# Patient Record
Sex: MALE | Race: BLACK OR AFRICAN AMERICAN | NOT HISPANIC OR LATINO | Employment: UNEMPLOYED | URBAN - METROPOLITAN AREA
[De-identification: names, ages, dates, MRNs, and addresses within clinical notes are randomized per-mention and may not be internally consistent; named-entity substitution may affect disease eponyms.]

---

## 2017-01-03 ENCOUNTER — OFFICE VISIT (OUTPATIENT)
Dept: INTERNAL MEDICINE | Facility: CLINIC | Age: 57
End: 2017-01-03
Payer: COMMERCIAL

## 2017-01-03 VITALS
HEIGHT: 70 IN | TEMPERATURE: 97 F | SYSTOLIC BLOOD PRESSURE: 124 MMHG | WEIGHT: 222 LBS | HEART RATE: 74 BPM | DIASTOLIC BLOOD PRESSURE: 80 MMHG | BODY MASS INDEX: 31.78 KG/M2

## 2017-01-03 DIAGNOSIS — S16.1XXS CERVICAL STRAIN, ACUTE, SEQUELA: Primary | ICD-10-CM

## 2017-01-03 DIAGNOSIS — S09.90XD MINOR HEAD INJURY, SUBSEQUENT ENCOUNTER: ICD-10-CM

## 2017-01-03 PROCEDURE — 99213 OFFICE O/P EST LOW 20 MIN: CPT | Mod: S$GLB,,, | Performed by: PHYSICIAN ASSISTANT

## 2017-01-03 PROCEDURE — 3079F DIAST BP 80-89 MM HG: CPT | Mod: S$GLB,,, | Performed by: PHYSICIAN ASSISTANT

## 2017-01-03 PROCEDURE — 1159F MED LIST DOCD IN RCRD: CPT | Mod: S$GLB,,, | Performed by: PHYSICIAN ASSISTANT

## 2017-01-03 PROCEDURE — 99999 PR PBB SHADOW E&M-EST. PATIENT-LVL III: CPT | Mod: PBBFAC,,, | Performed by: PHYSICIAN ASSISTANT

## 2017-01-03 PROCEDURE — 3074F SYST BP LT 130 MM HG: CPT | Mod: S$GLB,,, | Performed by: PHYSICIAN ASSISTANT

## 2017-01-03 RX ORDER — ETODOLAC 400 MG/1
TABLET, FILM COATED ORAL
Refills: 0 | COMMUNITY
Start: 2016-12-24 | End: 2017-01-11

## 2017-01-03 RX ORDER — OXYCODONE AND ACETAMINOPHEN 7.5; 325 MG/1; MG/1
TABLET ORAL
Refills: 0 | COMMUNITY
Start: 2016-12-24 | End: 2017-04-10

## 2017-01-03 RX ORDER — DIAZEPAM 5 MG/1
TABLET ORAL
Refills: 0 | COMMUNITY
Start: 2016-12-24 | End: 2017-08-29 | Stop reason: ALTCHOICE

## 2017-01-03 NOTE — PROGRESS NOTES
"Subjective:       Patient ID: Epifanio Godoy is a 56 y.o. male.    Chief Complaint: Letter for School/Work (return to work)    HPI  Patient comes in today for follow up neck injury and needs paperwork filled out to return to work.   On 12/22 he fell after a party and hit his head.  He did drink ETOH.  He was sent to [facility] ER and workup (CT scan head, neck imaging) was negative.   He was sent home.  He returned to ER 2 days later due to neck pain.  More images were run and were negative.  We have records and they were reviewed.  Patient was discharged on pain medication and anti-inflammatories and muscle relaxers.  He is doing much better. Has finished medication.  States he has no issues or pain at this time. He is ready to return to work.  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  We discussed his ability to lift, bend, squat, sit, stand, and climb, walk for multiple hours and he states he is perfectly fine doing all this. Denies limitations. 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       Past Medical History   Diagnosis Date    Diabetes mellitus type II     Hypertension     Kidney stones     Type II or unspecified type diabetes mellitus without mention of complication, not stated as uncontrolled        Current Outpatient Prescriptions   Medication Sig Dispense Refill    atorvastatin (LIPITOR) 40 MG tablet Take 1 tablet (40 mg total) by mouth once daily. 90 tablet 3    BD INSULIN PEN NEEDLE UF MINI 31 gauge x 3/16" Ndle USE 3 TIMES PER  each 10    canagliflozin (INVOKANA) 300 mg Tab Take 300 mg by mouth once daily. 30 tablet 11    insulin glargine (LANTUS SOLOSTAR) 100 unit/mL (3 mL) InPn pen INJECT 15-30 UNITS INTO THE SKIN ONCE DAILY. Pt wants 90 day supply 3 Box 3    liraglutide 0.6 mg/0.1 mL, 18 mg/3 mL, subq PNIJ (VICTOZA 3-JULIETTE) 0.6 mg/0.1 mL (18 mg/3 mL) PnIj Inject 1.8 mg into the skin once daily. Please " "dispense 90 day supply if possible 9 mL 3    losartan (COZAAR) 100 MG tablet Take 1 tablet (100 mg total) by mouth once daily. 1 Tablet Oral Every day 90 tablet 3    tadalafil (CIALIS) 20 MG Tab Use one tablet every 36 hours as needed for ED 6 tablet 5    diazePAM (VALIUM) 5 MG tablet TK 1 T PO  TID PRF MUSCLE SPASMS  0    etodolac (LODINE) 400 MG tablet TK 1 T PO  TID  0    oxycodone-acetaminophen (PERCOCET) 7.5-325 mg per tablet TK 1 T PO  Q 6 H PRF PAIN  0    triamcinolone acetonide 0.1% (KENALOG) 0.1 % cream Apply topically 2 (two) times daily. 30 g 3     No current facility-administered medications for this visit.        Review of Systems   Constitutional: Negative.    HENT: Negative.    Eyes: Negative.    Respiratory: Negative.    Cardiovascular: Negative.    Gastrointestinal: Negative.    Endocrine: Negative.    Genitourinary: Negative.    Musculoskeletal: Negative.    Allergic/Immunologic: Negative.    Neurological: Negative.    Hematological: Negative.    Psychiatric/Behavioral: Negative.        Objective:     Visit Vitals    /80    Pulse 74    Temp 97 °F (36.1 °C) (Tympanic)    Ht 5' 10" (1.778 m)    Wt 100.7 kg (222 lb)    BMI 31.85 kg/m2        Physical Exam   Constitutional: He is oriented to person, place, and time. He appears well-developed and well-nourished. No distress.   HENT:   Head: Normocephalic and atraumatic.       Right Ear: External ear normal.   Left Ear: External ear normal.   Nose: Nose normal.   Mouth/Throat: Oropharynx is clear and moist. No oropharyngeal exudate.   TMs normal    Eyes: Conjunctivae and EOM are normal. Pupils are equal, round, and reactive to light.   Neck: Trachea normal, normal range of motion and full passive range of motion without pain. Neck supple. No spinous process tenderness and no muscular tenderness present. No rigidity. No edema, no erythema and normal range of motion present. No thyroid mass present.   Cardiovascular: Normal rate.  "   Abdominal: Soft. Bowel sounds are normal.   Genitourinary:   Genitourinary Comments: Exam not done    Musculoskeletal: Normal range of motion.   Neurological: He is alert and oriented to person, place, and time. He has normal reflexes.   Skin: Skin is warm.   Psychiatric: He has a normal mood and affect. His behavior is normal. Judgment and thought content normal.   Vitals reviewed.        Lab Results   Component Value Date    WBC 5.09 10/31/2016    HGB 13.7 (L) 10/31/2016    HCT 42.7 10/31/2016     10/31/2016    CHOL 152 10/31/2016    TRIG 68 11/08/2016    HDL 25 (L) 10/31/2016    ALT 24 10/31/2016    AST 24 10/31/2016     10/31/2016    K 4.4 10/31/2016     10/31/2016    CREATININE 1.1 10/31/2016    BUN 15 10/31/2016    CO2 19 (L) 10/31/2016    TSH 1.294 10/31/2016    PSA 0.24 01/12/2016    HGBA1C 6.2 10/31/2016       Assessment:       1. Cervical strain, acute, sequela    2. Minor head injury, subsequent encounter        Plan:   Cervical strain, acute, sequela    Minor head injury, subsequent encounter      Reviewed hosp records  All scans negative   Patient improved and issue resolved.    Ok to return to work.

## 2017-01-03 NOTE — MR AVS SNAPSHOT
"    [facility]  [address] [place] [address]  Phone: [phone]  Fax: [phone]                  Epifanio Godoy   1/3/2017 8:20 AM   Office Visit    Description:  Male : 1960   Provider:  LAUREN Mcneill   [facility]           Reason for Visit     Letter for School/Work                To Do List           Future Appointments        Provider Department Dept Phone    2017 3:00 PM Holly Shepherd MD [facility] [phone]      Goals (5 Years of Data)     None      [facility]     [facility] On Call Nurse Care Line -  Assistance  Registered nurses in the [facility] On Call Center provide clinical advisement, health education, appointment booking, and other advisory services.  Call for this free service at [phone].             Medications           Message regarding Medications     Verify the changes and/or additions to your medication regime listed below are the same as discussed with your clinician today.  If any of these changes or additions are incorrect, please notify your healthcare provider.             Verify that the below list of medications is an accurate representation of the medications you are currently taking.  If none reported, the list may be blank. If incorrect, please contact your healthcare provider. Carry this list with you in case of emergency.           Current Medications     atorvastatin (LIPITOR) 40 MG tablet Take 1 tablet (40 mg total) by mouth once daily.    BD INSULIN PEN NEEDLE UF MINI 31 gauge x 3/16" Ndle USE 3 TIMES PER DAY    canagliflozin (INVOKANA) 300 mg Tab Take 300 mg by mouth once daily.    insulin glargine (LANTUS SOLOSTAR) 100 unit/mL (3 mL) InPn pen INJECT 15-30 UNITS INTO THE SKIN ONCE DAILY. Pt wants 90 day supply    liraglutide 0.6 mg/0.1 mL, 18 mg/3 mL, subq PNIJ (VICTOZA 3-JULIETTE) 0.6 mg/0.1 mL (18 mg/3 mL) PnIj Inject 1.8 mg into the skin once daily. Please " "dispense 90 day supply if possible    losartan (COZAAR) 100 MG tablet Take 1 tablet (100 mg total) by mouth once daily. 1 Tablet Oral Every day    tadalafil (CIALIS) 20 MG Tab Use one tablet every 36 hours as needed for ED    diazePAM (VALIUM) 5 MG tablet TK 1 T PO  TID PRF MUSCLE SPASMS    etodolac (LODINE) 400 MG tablet TK 1 T PO  TID    oxycodone-acetaminophen (PERCOCET) 7.5-325 mg per tablet TK 1 T PO  Q 6 H PRF PAIN    triamcinolone acetonide 0.1% (KENALOG) 0.1 % cream Apply topically 2 (two) times daily.           Clinical Reference Information           Vital Signs - Last Recorded  Most recent update: 1/3/2017  8:39 AM by Sharita Ramos LPN    BP Pulse Temp Ht Wt BMI    124/80 74 97 °F (36.1 °C) (Tympanic) 5' 10" (1.778 m) 100.7 kg (222 lb) 31.85 kg/m2      Blood Pressure          Most Recent Value    BP  124/80      Allergies as of 1/3/2017     Bactrim  [Sulfamethoxazole-trimethoprim]    Clindamycin    Sulfa (Sulfonamide Antibiotics)      Immunizations Administered on Date of Encounter - 1/3/2017     None      [facility] Sign-Up     Activating your [facility] account is as easy as 1-2-3!     1) Visit [email or web address], select Sign Up Now, enter this activation code and your date of birth, then select Next.  Activation code not generated  Current Patient Portal Status: Account disabled      2) Create a username and password to use when you visit [facility] in the future and select a security question in case you lose your password and select Next.    3) Enter your e-mail address and click Sign Up!    Additional Information  If you have questions, please e-mail [email or web address] or call [phone] to talk to our [facility] staff. Remember, [facility] is NOT to be used for urgent needs. For medical emergencies, dial 911.         Smoking Cessation     If you would like to quit smoking:   You may be eligible for free services if you are a [place] resident and started smoking cigarettes before September " 1, 1988.  Call the Smoking Cessation Trust (SCT) toll free at (478) 696-1024 or (363) 976-2785.   Call 2-800-QUIT-NOW if you do not meet the above criteria.

## 2017-01-10 ENCOUNTER — TELEPHONE (OUTPATIENT)
Dept: INTERNAL MEDICINE | Facility: CLINIC | Age: 57
End: 2017-01-10

## 2017-01-10 NOTE — TELEPHONE ENCOUNTER
Patient no showed a scheduled appointment with  on 1/9/17 I called to f/c and patient rescheduled he missed due to a injury he saw yusra holley

## 2017-01-11 ENCOUNTER — LAB VISIT (OUTPATIENT)
Dept: LAB | Facility: HOSPITAL | Age: 57
End: 2017-01-11
Attending: FAMILY MEDICINE
Payer: COMMERCIAL

## 2017-01-11 ENCOUNTER — OFFICE VISIT (OUTPATIENT)
Dept: INTERNAL MEDICINE | Facility: CLINIC | Age: 57
End: 2017-01-11
Payer: COMMERCIAL

## 2017-01-11 VITALS
HEART RATE: 80 BPM | WEIGHT: 221.56 LBS | SYSTOLIC BLOOD PRESSURE: 130 MMHG | DIASTOLIC BLOOD PRESSURE: 80 MMHG | TEMPERATURE: 97 F | HEIGHT: 70 IN | BODY MASS INDEX: 31.72 KG/M2

## 2017-01-11 DIAGNOSIS — E11.9 TYPE 2 DIABETES MELLITUS WITHOUT COMPLICATION, WITH LONG-TERM CURRENT USE OF INSULIN: Primary | ICD-10-CM

## 2017-01-11 DIAGNOSIS — Z79.4 TYPE 2 DIABETES MELLITUS WITHOUT COMPLICATION, WITH LONG-TERM CURRENT USE OF INSULIN: ICD-10-CM

## 2017-01-11 DIAGNOSIS — E11.69 HYPERLIPIDEMIA ASSOCIATED WITH TYPE 2 DIABETES MELLITUS: ICD-10-CM

## 2017-01-11 DIAGNOSIS — Z79.4 TYPE 2 DIABETES MELLITUS WITHOUT COMPLICATION, WITH LONG-TERM CURRENT USE OF INSULIN: Primary | ICD-10-CM

## 2017-01-11 DIAGNOSIS — I10 ESSENTIAL HYPERTENSION: ICD-10-CM

## 2017-01-11 DIAGNOSIS — E11.9 TYPE 2 DIABETES MELLITUS WITHOUT COMPLICATION, WITH LONG-TERM CURRENT USE OF INSULIN: ICD-10-CM

## 2017-01-11 DIAGNOSIS — Z72.0 TOBACCO ABUSE: ICD-10-CM

## 2017-01-11 DIAGNOSIS — E78.5 HYPERLIPIDEMIA ASSOCIATED WITH TYPE 2 DIABETES MELLITUS: ICD-10-CM

## 2017-01-11 LAB
ALBUMIN SERPL BCP-MCNC: 3.8 G/DL
ALP SERPL-CCNC: 75 U/L
ALT SERPL W/O P-5'-P-CCNC: 34 U/L
ANION GAP SERPL CALC-SCNC: 6 MMOL/L
AST SERPL-CCNC: 25 U/L
BILIRUB SERPL-MCNC: 0.3 MG/DL
BUN SERPL-MCNC: 13 MG/DL
CALCIUM SERPL-MCNC: 9.4 MG/DL
CHLORIDE SERPL-SCNC: 108 MMOL/L
CHOLEST/HDLC SERPL: 3.7 {RATIO}
CO2 SERPL-SCNC: 26 MMOL/L
CREAT SERPL-MCNC: 1 MG/DL
EST. GFR  (AFRICAN AMERICAN): >60 ML/MIN/1.73 M^2
EST. GFR  (NON AFRICAN AMERICAN): >60 ML/MIN/1.73 M^2
GLUCOSE SERPL-MCNC: 142 MG/DL
HDL/CHOLESTEROL RATIO: 27.3 %
HDLC SERPL-MCNC: 128 MG/DL
HDLC SERPL-MCNC: 35 MG/DL
LDLC SERPL CALC-MCNC: 72.2 MG/DL
NONHDLC SERPL-MCNC: 93 MG/DL
POTASSIUM SERPL-SCNC: 4 MMOL/L
PROT SERPL-MCNC: 7.5 G/DL
SODIUM SERPL-SCNC: 140 MMOL/L
TRIGL SERPL-MCNC: 104 MG/DL

## 2017-01-11 PROCEDURE — 3044F HG A1C LEVEL LT 7.0%: CPT | Mod: S$GLB,,, | Performed by: FAMILY MEDICINE

## 2017-01-11 PROCEDURE — 4010F ACE/ARB THERAPY RXD/TAKEN: CPT | Mod: S$GLB,,, | Performed by: FAMILY MEDICINE

## 2017-01-11 PROCEDURE — 99214 OFFICE O/P EST MOD 30 MIN: CPT | Mod: S$GLB,,, | Performed by: FAMILY MEDICINE

## 2017-01-11 PROCEDURE — 80053 COMPREHEN METABOLIC PANEL: CPT

## 2017-01-11 PROCEDURE — 80061 LIPID PANEL: CPT

## 2017-01-11 PROCEDURE — 83036 HEMOGLOBIN GLYCOSYLATED A1C: CPT

## 2017-01-11 PROCEDURE — 99999 PR PBB SHADOW E&M-EST. PATIENT-LVL III: CPT | Mod: PBBFAC,,, | Performed by: FAMILY MEDICINE

## 2017-01-11 PROCEDURE — 3075F SYST BP GE 130 - 139MM HG: CPT | Mod: S$GLB,,, | Performed by: FAMILY MEDICINE

## 2017-01-11 PROCEDURE — 2022F DILAT RTA XM EVC RTNOPTHY: CPT | Mod: S$GLB,,, | Performed by: FAMILY MEDICINE

## 2017-01-11 PROCEDURE — 36415 COLL VENOUS BLD VENIPUNCTURE: CPT | Mod: PO

## 2017-01-11 PROCEDURE — 3079F DIAST BP 80-89 MM HG: CPT | Mod: S$GLB,,, | Performed by: FAMILY MEDICINE

## 2017-01-11 PROCEDURE — 1159F MED LIST DOCD IN RCRD: CPT | Mod: S$GLB,,, | Performed by: FAMILY MEDICINE

## 2017-01-11 RX ORDER — IBUPROFEN 800 MG/1
800 TABLET ORAL EVERY 6 HOURS PRN
Qty: 40 TABLET | Refills: 0 | Status: SHIPPED | OUTPATIENT
Start: 2017-01-11 | End: 2017-01-21

## 2017-01-11 NOTE — PROGRESS NOTES
Subjective:      Patient ID: Epifanio Godoy is a 56 y.o. male.    Chief Complaint: Follow-up (dm, htn, chol)    HPI Comments: Patient's coming in today for follow-up of chronic issues.    Last a1c was at  6.3 on lantus 25 units and victoza 1.8mg. now he is currently down 6 pounds.  He is now down also to only using 15 units of the Lantus.  Also on invokana now. No problems.     He has controlled blood pressure today.   He denies any headaches or chest pain.  He has  been taking his losartan 100mg.     The patient is a smoker.  Not quitting currently. Willing to work on now.    Also cholesterol we reviewed today his current CV risk is now at 34.9. Back on cholesterol meds again.     The 10-year ASCVD risk score (Matias LIRA Jr, et al., 2013) is: 34.9%    Values used to calculate the score:      Age: 56 years      Sex: Male      Is Non- : Yes      Diabetic: Yes      Tobacco smoker: Yes      Systolic Blood Pressure: 130 mmHg      Is BP treated: Yes      HDL Cholesterol: 25 mg/dL      Total Cholesterol: 152 mg/dL        Lab Results   Component Value Date    WBC 5.09 10/31/2016    HGB 13.7 (L) 10/31/2016    HCT 42.7 10/31/2016     10/31/2016    CHOL 128 01/11/2017    TRIG 104 01/11/2017    HDL 35 (L) 01/11/2017    ALT 34 01/11/2017    AST 25 01/11/2017     01/11/2017    K 4.0 01/11/2017     01/11/2017    CREATININE 1.0 01/11/2017    BUN 13 01/11/2017    CO2 26 01/11/2017    TSH 1.294 10/31/2016    PSA 0.24 01/12/2016    HGBA1C 6.4 (H) 01/11/2017       Review of Systems   Constitutional: Negative for chills and fatigue.        Intentional wt loss of 6lbs   HENT: Negative for sore throat and trouble swallowing.    Respiratory: Negative for cough and shortness of breath.    Cardiovascular: Negative for chest pain and leg swelling.   Gastrointestinal: Negative for abdominal pain, constipation, diarrhea and nausea.   Genitourinary: Negative for difficulty urinating, dysuria and flank  pain.   Musculoskeletal: Negative for arthralgias and joint swelling.   Neurological: Negative for dizziness and headaches.     Objective:     Physical Exam   Constitutional: He appears well-developed and well-nourished.   HENT:   Head: Normocephalic and atraumatic.   Right Ear: Tympanic membrane normal.   Left Ear: Tympanic membrane normal.   Mouth/Throat: Oropharynx is clear and moist.   Eyes: Conjunctivae and EOM are normal.   Neck: Normal range of motion. Neck supple.   Cardiovascular: Normal rate and regular rhythm.    Pulmonary/Chest: Effort normal and breath sounds normal.   Psychiatric: He has a normal mood and affect. His behavior is normal.   Nursing note and vitals reviewed.    Assessment:     1. Type 2 diabetes mellitus without complication, with long-term current use of insulin    2. Hyperlipidemia associated with type 2 diabetes mellitus    3. Essential hypertension    4. Tobacco abuse      Plan:   Epifanio was seen today for follow-up.    Diagnoses and all orders for this visit:    Type 2 diabetes mellitus without complication, with long-term current use of insulin- improving. Reducing dose on lantus. Cont with weight loss. Down 6lbs.   -     Hemoglobin A1c; Future  -     Lipid panel; Future  -     Comprehensive metabolic panel; Future    Hyperlipidemia associated with type 2 diabetes mellitus- - stable, Continue with current medications and interventions. Labs reviewed.     -     Hemoglobin A1c; Future  -     Lipid panel; Future  -     Comprehensive metabolic panel; Future    Essential hypertension- stable, Continue with current medications and interventions. Labs reviewed.  -     Hemoglobin A1c; Future  -     Lipid panel; Future  -     Comprehensive metabolic panel; Future    Tobacco abuse- cont to work on quitting    Other orders  -     ibuprofen (ADVIL,MOTRIN) 800 MG tablet; Take 1 tablet (800 mg total) by mouth every 6 (six) hours as needed for Pain.               Return in about 6 months (around  7/11/2017) for dm, chol, bp.

## 2017-01-12 LAB
ESTIMATED AVG GLUCOSE: 137 MG/DL
HBA1C MFR BLD HPLC: 6.4 %

## 2017-01-13 ENCOUNTER — TELEPHONE (OUTPATIENT)
Dept: INTERNAL MEDICINE | Facility: CLINIC | Age: 57
End: 2017-01-13

## 2017-01-13 DIAGNOSIS — I10 ESSENTIAL HYPERTENSION: ICD-10-CM

## 2017-01-13 DIAGNOSIS — E11.69 HYPERLIPIDEMIA ASSOCIATED WITH TYPE 2 DIABETES MELLITUS: ICD-10-CM

## 2017-01-13 DIAGNOSIS — E11.9 TYPE 2 DIABETES MELLITUS WITHOUT COMPLICATION, WITH LONG-TERM CURRENT USE OF INSULIN: Primary | ICD-10-CM

## 2017-01-13 DIAGNOSIS — E78.5 HYPERLIPIDEMIA ASSOCIATED WITH TYPE 2 DIABETES MELLITUS: ICD-10-CM

## 2017-01-13 DIAGNOSIS — Z12.5 PROSTATE CANCER SCREENING: ICD-10-CM

## 2017-01-13 DIAGNOSIS — Z79.4 TYPE 2 DIABETES MELLITUS WITHOUT COMPLICATION, WITH LONG-TERM CURRENT USE OF INSULIN: Primary | ICD-10-CM

## 2017-03-30 ENCOUNTER — PATIENT MESSAGE (OUTPATIENT)
Dept: INTERNAL MEDICINE | Facility: CLINIC | Age: 57
End: 2017-03-30

## 2017-03-31 NOTE — TELEPHONE ENCOUNTER
Can see someone today as midlevel or yes can work in next week on Tues or Wed. Would prefer for him to come in the morning sometime when we have xray.

## 2017-04-10 ENCOUNTER — HOSPITAL ENCOUNTER (OUTPATIENT)
Dept: RADIOLOGY | Facility: HOSPITAL | Age: 57
Discharge: HOME | End: 2017-04-10
Attending: INTERNAL MEDICINE
Payer: COMMERCIAL

## 2017-04-10 ENCOUNTER — OFFICE VISIT (OUTPATIENT)
Dept: INTERNAL MEDICINE | Facility: CLINIC | Age: 57
End: 2017-04-10
Payer: COMMERCIAL

## 2017-04-10 VITALS
HEIGHT: 70 IN | HEART RATE: 64 BPM | TEMPERATURE: 98 F | BODY MASS INDEX: 31.75 KG/M2 | WEIGHT: 221.81 LBS | DIASTOLIC BLOOD PRESSURE: 80 MMHG | SYSTOLIC BLOOD PRESSURE: 120 MMHG

## 2017-04-10 DIAGNOSIS — R20.2 PARESTHESIA OF BOTH HANDS: Primary | ICD-10-CM

## 2017-04-10 DIAGNOSIS — R20.2 PARESTHESIA OF BOTH HANDS: ICD-10-CM

## 2017-04-10 PROCEDURE — 3074F SYST BP LT 130 MM HG: CPT | Mod: S$GLB,,, | Performed by: INTERNAL MEDICINE

## 2017-04-10 PROCEDURE — 3079F DIAST BP 80-89 MM HG: CPT | Mod: S$GLB,,, | Performed by: INTERNAL MEDICINE

## 2017-04-10 PROCEDURE — 99213 OFFICE O/P EST LOW 20 MIN: CPT | Mod: S$GLB,,, | Performed by: INTERNAL MEDICINE

## 2017-04-10 PROCEDURE — 99999 PR PBB SHADOW E&M-EST. PATIENT-LVL III: CPT | Mod: PBBFAC,,, | Performed by: INTERNAL MEDICINE

## 2017-04-10 PROCEDURE — 72040 X-RAY EXAM NECK SPINE 2-3 VW: CPT | Mod: 26,,, | Performed by: RADIOLOGY

## 2017-04-10 PROCEDURE — 1160F RVW MEDS BY RX/DR IN RCRD: CPT | Mod: S$GLB,,, | Performed by: INTERNAL MEDICINE

## 2017-04-10 PROCEDURE — 72040 X-RAY EXAM NECK SPINE 2-3 VW: CPT | Mod: TC,PO

## 2017-04-10 NOTE — ASSESSMENT & PLAN NOTE
Started after fall with LOC in December.  No improvement since.  No weakness.  C spine xray, refer to neurology.

## 2017-04-10 NOTE — MR AVS SNAPSHOT
[facility]  [address] [place] [address]  Phone: [phone]  Fax: [phone]                  Epifanio Godoy   4/10/2017 10:00 AM   Office Visit    Description:  Male : 1960   Provider:  Tamir Dai MD   [facility]           Reason for Visit     Numbness           Diagnoses this Visit        Comments    Paresthesia of both hands    -  Primary            To Do List           Future Appointments        Provider Department Dept Phone    4/10/2017 10:30 AM [facility] XR1 [facility] [phone]    2017 9:20 AM LABORATORY, [place] [facility] [phone]    2017 1:20 PM Holly Shepherd MD [facility] [phone]      Goals (5 Years of Data)     None      Follow-Up and Disposition     Return if symptoms worsen or fail to improve.      [facility]     [facility] Nurse Care Line -  Assistance  Unless otherwise directed by your provider, please contact [facility], our nurse care line that is available for  assistance.     Registered nurses in the [facility] Center provide: appointment scheduling, clinical advisement, health education, and other advisory services.  Call: [phone] (toll free)               Medications           Message regarding Medications     Verify the changes and/or additions to your medication regime listed below are the same as discussed with your clinician today.  If any of these changes or additions are incorrect, please notify your healthcare provider.        STOP taking these medications     oxycodone-acetaminophen (PERCOCET) 7.5-325 mg per tablet TK 1 T PO  Q 6 H PRF PAIN           Verify that the below list of medications is an accurate representation of the medications you are currently taking.  If none reported, the list may be blank. If incorrect, please contact your healthcare provider. Carry this list with  "you in case of emergency.           Current Medications     atorvastatin (LIPITOR) 40 MG tablet Take 1 tablet (40 mg total) by mouth once daily.    BD INSULIN PEN NEEDLE UF MINI 31 gauge x 3/16" Ndle USE 3 TIMES PER DAY    canagliflozin (INVOKANA) 300 mg Tab Take 300 mg by mouth once daily.    diazePAM (VALIUM) 5 MG tablet TK 1 T PO  TID PRF MUSCLE SPASMS    insulin glargine (LANTUS SOLOSTAR) 100 unit/mL (3 mL) InPn pen INJECT 15-30 UNITS INTO THE SKIN ONCE DAILY. Pt wants 90 day supply    liraglutide 0.6 mg/0.1 mL, 18 mg/3 mL, subq PNIJ (VICTOZA 3-JULIETTE) 0.6 mg/0.1 mL (18 mg/3 mL) PnIj Inject 1.8 mg into the skin once daily. Please dispense 90 day supply if possible    losartan (COZAAR) 100 MG tablet Take 1 tablet (100 mg total) by mouth once daily. 1 Tablet Oral Every day    tadalafil (CIALIS) 20 MG Tab Use one tablet every 36 hours as needed for ED    triamcinolone acetonide 0.1% (KENALOG) 0.1 % cream Apply topically 2 (two) times daily.           Clinical Reference Information           Your Vitals Were     BP Pulse Temp Height Weight BMI    120/80 64 98.2 °F (36.8 °C) (Tympanic) 5' 10" (1.778 m) 100.6 kg (221 lb 12.5 oz) 31.82 kg/m2      Blood Pressure          Most Recent Value    BP  120/80      Allergies as of 4/10/2017     Bactrim  [Sulfamethoxazole-trimethoprim]    Clindamycin    Sulfa (Sulfonamide Antibiotics)      Immunizations Administered on Date of Encounter - 4/10/2017     None      Orders Placed During Today's Visit      Normal Orders This Visit    Ambulatory referral to Neurology     Future Labs/Procedures Expected by Expires    X-Ray Cervical Spine AP And Lateral  4/10/2017 4/10/2018      Smoking Cessation     If you would like to quit smoking:   You may be eligible for free services if you are a [place] resident and started smoking cigarettes before September 1, 1988.  Call the Smoking Cessation Trust (SCT) toll free at (473) 382-8502 or (468) 478-9037.   Call 1-800-QUIT-NOW if you do not meet " the above criteria.   Contact us via email: [email or web address]   View our website for more information: [email or web address]        Language Assistance Services     ATTENTION: Language assistance services are available, free of charge. Please call [phone].      ATENCIÓN: Si habla tavo, tiene a bean disposición servicios gratuitos de asistencia lingüística. Llame al [phone].     CHÚ Ý: N?u b?n nói Ti?ng Vi?t, có các d?ch v? h? tr? ngôn ng? mi?n phí dành cho b?n. G?i s? [phone].         [facility] complies with applicable Federal civil rights laws and does not discriminate on the basis of race, color, national origin, age, disability, or sex.

## 2017-04-10 NOTE — PROGRESS NOTES
"Subjective:       Patient ID: Epifanio Godoy is a 57 y.o. male.    Chief Complaint: Numbness (fingers)    HPI  patient is a 57-year-old male presenting today with complaints of numbness in his fingers.  He states he has numbness across the fingers of both hands.  This is been going on since early January.  He states that he fell in late December.  He fell backwards and struck his head on the ground.  He had about a five-minute loss of consciousness.  He was taken to the emergency room where they x-rayed his neck and reduced he had no fractures.  They gave him some pain medication and muscle relaxants and some on his way.  He states since that time he has had issues with tingling in his hands primarily the tips of the fingers bilaterally.  He also has a feeling of electrical feeling when he flexes his neck fully.  It is not getting worse but is not getting better.  He has not noticed loss of  strength or function.  Patient is also noted to be a type II diabetic.    Review of Systems   Constitutional: Negative for fever and unexpected weight change.   Respiratory: Negative for cough, shortness of breath and wheezing.    Cardiovascular: Negative for chest pain and palpitations.   Gastrointestinal: Negative for constipation, diarrhea, nausea and vomiting.   Genitourinary: Negative for dysuria and hematuria.   Neurological: Positive for numbness.       Objective:   /80  Pulse 64  Temp 98.2 °F (36.8 °C) (Tympanic)   Ht 5' 10" (1.778 m)  Wt 100.6 kg (221 lb 12.5 oz)  BMI 31.82 kg/m2     Physical Exam   Constitutional: He is oriented to person, place, and time. He appears well-developed and well-nourished.   HENT:   Head: Normocephalic and atraumatic.   Eyes: Pupils are equal, round, and reactive to light.   Neck: Neck supple. No thyromegaly present.   Cardiovascular: Normal rate, regular rhythm and normal heart sounds.  Exam reveals no gallop and no friction rub.    No murmur heard.  Pulmonary/Chest: Breath " sounds normal. He has no wheezes. He has no rales.   Abdominal: Soft. Bowel sounds are normal. He exhibits no distension. There is no tenderness.   Neurological: He is alert and oriented to person, place, and time. No cranial nerve deficit.   Gross sensation is intact in the hands fully.   strength is 5 out of 5 bilaterally.  Biceps and triceps strength is 5 out of 5 bilaterally.  Patient has full range of motion of the neck.  Vaguely positive Tinel's in the left wrist   Vitals reviewed.          Assessment:       1. Paresthesia of both hands        Plan:   Paresthesia of both hands  Started after fall with LOC in December.  No improvement since.  No weakness.  C spine xray, refer to neurology.    Epifanio was seen today for numbness.    Diagnoses and all orders for this visit:    Paresthesia of both hands  -     X-Ray Cervical Spine AP And Lateral; Future  -     Ambulatory referral to Neurology    Patient will continue taking his Celebrex as previously prescribed.  He is not showing advanced neurological changes and this could be a peripheral neuropathy or related to this incident.  I recommended we do an updated x-ray of the cervical spine and make sure there is no obvious disc disease and refer him to neurology for further assessment.  He is in agreement with the plan.    Return if symptoms worsen or fail to improve.

## 2017-04-11 ENCOUNTER — PATIENT MESSAGE (OUTPATIENT)
Dept: INTERNAL MEDICINE | Facility: CLINIC | Age: 57
End: 2017-04-11

## 2017-04-11 DIAGNOSIS — M50.30 DDD (DEGENERATIVE DISC DISEASE), CERVICAL: Primary | ICD-10-CM

## 2017-04-11 DIAGNOSIS — R20.2 PARESTHESIA: ICD-10-CM

## 2017-04-11 NOTE — TELEPHONE ENCOUNTER
Patient was informed of his results via phone. Pt. Verbalized understanding.  Patient stated he will wait for physical therapy to reach out to him.

## 2017-04-19 ENCOUNTER — TELEPHONE (OUTPATIENT)
Dept: INTERNAL MEDICINE | Facility: CLINIC | Age: 57
End: 2017-04-19

## 2017-04-19 NOTE — TELEPHONE ENCOUNTER
----- Message from Maritza Abraham sent at 4/19/2017 12:23 PM CDT -----  Contact: pt wife   Pt wife state she was calling about insurance prior authorization. Please call pt insurance for meds. Pt wife can be reach at [phone].

## 2017-04-19 NOTE — TELEPHONE ENCOUNTER
----- Message from Evy Lopez sent at 4/19/2017 12:39 PM CDT -----  Contact: pt wife mathew Godoy  Pt ran out of his Diabetes medicine and need a refill on invokana diabetes his [facility]  [address] [facility] [place] you can contact pt. Wife Mrs.Jawana Godoy at  [phone] prescription when it sent over. [facility] is asking for referral

## 2017-04-20 RX ORDER — PEN NEEDLE, DIABETIC 30 GX3/16"
NEEDLE, DISPOSABLE MISCELLANEOUS
Qty: 100 EACH | Refills: 10 | Status: SHIPPED | OUTPATIENT
Start: 2017-04-20 | End: 2017-08-29 | Stop reason: SDUPTHER

## 2017-04-20 NOTE — TELEPHONE ENCOUNTER
----- Message from Stacey Bradley sent at 4/20/2017 12:27 PM CDT -----  Contact: Anthony /spouse  Wife said the nurse is working on getting a preauthorization on pt's Invokana but  In the meantime pt wanted to see if BD pen mini needles could be called into [facility] to hold him over until the Invokana is approved.  Please call wife at [phone] to let her know this has been done.  If this can be done asap because pt has been off diabetese medications for 4 days now.  Wife states his sugar level is up/elevated because of not being on medication.  Thanks.

## 2017-06-29 DIAGNOSIS — Z79.4 TYPE 2 DIABETES MELLITUS WITHOUT COMPLICATION, WITH LONG-TERM CURRENT USE OF INSULIN: Primary | ICD-10-CM

## 2017-06-29 DIAGNOSIS — E11.9 TYPE 2 DIABETES MELLITUS WITHOUT COMPLICATION, WITH LONG-TERM CURRENT USE OF INSULIN: Primary | ICD-10-CM

## 2017-08-18 ENCOUNTER — LAB VISIT (OUTPATIENT)
Dept: LAB | Facility: HOSPITAL | Age: 57
End: 2017-08-18
Attending: FAMILY MEDICINE
Payer: COMMERCIAL

## 2017-08-18 DIAGNOSIS — Z79.4 TYPE 2 DIABETES MELLITUS WITHOUT COMPLICATION, WITH LONG-TERM CURRENT USE OF INSULIN: ICD-10-CM

## 2017-08-18 DIAGNOSIS — E11.9 TYPE 2 DIABETES MELLITUS WITHOUT COMPLICATION, WITH LONG-TERM CURRENT USE OF INSULIN: ICD-10-CM

## 2017-08-18 DIAGNOSIS — I10 ESSENTIAL HYPERTENSION: ICD-10-CM

## 2017-08-18 DIAGNOSIS — E78.5 HYPERLIPIDEMIA ASSOCIATED WITH TYPE 2 DIABETES MELLITUS: ICD-10-CM

## 2017-08-18 DIAGNOSIS — Z12.5 PROSTATE CANCER SCREENING: ICD-10-CM

## 2017-08-18 DIAGNOSIS — E11.69 HYPERLIPIDEMIA ASSOCIATED WITH TYPE 2 DIABETES MELLITUS: ICD-10-CM

## 2017-08-18 LAB
ALBUMIN SERPL BCP-MCNC: 3.6 G/DL
ALP SERPL-CCNC: 75 U/L
ALT SERPL W/O P-5'-P-CCNC: 19 U/L
ANION GAP SERPL CALC-SCNC: 8 MMOL/L
AST SERPL-CCNC: 21 U/L
BASOPHILS # BLD AUTO: 0.01 K/UL
BASOPHILS NFR BLD: 0.2 %
BILIRUB SERPL-MCNC: 0.3 MG/DL
BUN SERPL-MCNC: 10 MG/DL
CALCIUM SERPL-MCNC: 8.8 MG/DL
CHLORIDE SERPL-SCNC: 110 MMOL/L
CHOLEST/HDLC SERPL: 3.2 {RATIO}
CO2 SERPL-SCNC: 22 MMOL/L
COMPLEXED PSA SERPL-MCNC: 0.18 NG/ML
CREAT SERPL-MCNC: 1 MG/DL
DIFFERENTIAL METHOD: ABNORMAL
EOSINOPHIL # BLD AUTO: 0.1 K/UL
EOSINOPHIL NFR BLD: 1.9 %
ERYTHROCYTE [DISTWIDTH] IN BLOOD BY AUTOMATED COUNT: 15.5 %
EST. GFR  (AFRICAN AMERICAN): >60 ML/MIN/1.73 M^2
EST. GFR  (NON AFRICAN AMERICAN): >60 ML/MIN/1.73 M^2
GLUCOSE SERPL-MCNC: 91 MG/DL
HCT VFR BLD AUTO: 37.9 %
HDL/CHOLESTEROL RATIO: 31.5 %
HDLC SERPL-MCNC: 124 MG/DL
HDLC SERPL-MCNC: 39 MG/DL
HGB BLD-MCNC: 12.8 G/DL
LDLC SERPL CALC-MCNC: 63.4 MG/DL
LYMPHOCYTES # BLD AUTO: 2.4 K/UL
LYMPHOCYTES NFR BLD: 46.3 %
MCH RBC QN AUTO: 28 PG
MCHC RBC AUTO-ENTMCNC: 33.8 G/DL
MCV RBC AUTO: 83 FL
MONOCYTES # BLD AUTO: 0.4 K/UL
MONOCYTES NFR BLD: 7.5 %
NEUTROPHILS # BLD AUTO: 2.3 K/UL
NEUTROPHILS NFR BLD: 43.9 %
NONHDLC SERPL-MCNC: 85 MG/DL
PLATELET # BLD AUTO: 290 K/UL
PMV BLD AUTO: 9.7 FL
POTASSIUM SERPL-SCNC: 4 MMOL/L
PROT SERPL-MCNC: 7.1 G/DL
RBC # BLD AUTO: 4.57 M/UL
SODIUM SERPL-SCNC: 140 MMOL/L
TRIGL SERPL-MCNC: 108 MG/DL
WBC # BLD AUTO: 5.18 K/UL

## 2017-08-18 PROCEDURE — 85025 COMPLETE CBC W/AUTO DIFF WBC: CPT

## 2017-08-18 PROCEDURE — 84153 ASSAY OF PSA TOTAL: CPT

## 2017-08-18 PROCEDURE — 83036 HEMOGLOBIN GLYCOSYLATED A1C: CPT

## 2017-08-18 PROCEDURE — 36415 COLL VENOUS BLD VENIPUNCTURE: CPT | Mod: PO

## 2017-08-18 PROCEDURE — 80053 COMPREHEN METABOLIC PANEL: CPT

## 2017-08-18 PROCEDURE — 80061 LIPID PANEL: CPT

## 2017-08-19 LAB
ESTIMATED AVG GLUCOSE: 137 MG/DL
HBA1C MFR BLD HPLC: 6.4 %

## 2017-08-29 ENCOUNTER — OFFICE VISIT (OUTPATIENT)
Dept: INTERNAL MEDICINE | Facility: CLINIC | Age: 57
End: 2017-08-29
Payer: COMMERCIAL

## 2017-08-29 VITALS
HEIGHT: 70 IN | WEIGHT: 227 LBS | TEMPERATURE: 97 F | HEART RATE: 80 BPM | DIASTOLIC BLOOD PRESSURE: 84 MMHG | BODY MASS INDEX: 32.5 KG/M2 | SYSTOLIC BLOOD PRESSURE: 139 MMHG

## 2017-08-29 DIAGNOSIS — I10 ESSENTIAL HYPERTENSION: ICD-10-CM

## 2017-08-29 DIAGNOSIS — E78.5 HYPERLIPIDEMIA ASSOCIATED WITH TYPE 2 DIABETES MELLITUS: ICD-10-CM

## 2017-08-29 DIAGNOSIS — E11.9 TYPE 2 DIABETES MELLITUS WITHOUT COMPLICATION, WITH LONG-TERM CURRENT USE OF INSULIN: ICD-10-CM

## 2017-08-29 DIAGNOSIS — D64.9 ANEMIA, UNSPECIFIED TYPE: ICD-10-CM

## 2017-08-29 DIAGNOSIS — E11.69 HYPERLIPIDEMIA ASSOCIATED WITH TYPE 2 DIABETES MELLITUS: ICD-10-CM

## 2017-08-29 DIAGNOSIS — Z72.0 TOBACCO ABUSE: ICD-10-CM

## 2017-08-29 DIAGNOSIS — L84 CALLUS OF FOOT: ICD-10-CM

## 2017-08-29 DIAGNOSIS — H65.493 CHRONIC MEE (MIDDLE EAR EFFUSION), BILATERAL: ICD-10-CM

## 2017-08-29 DIAGNOSIS — Z00.00 ROUTINE GENERAL MEDICAL EXAMINATION AT A HEALTH CARE FACILITY: Primary | ICD-10-CM

## 2017-08-29 DIAGNOSIS — Z79.4 TYPE 2 DIABETES MELLITUS WITHOUT COMPLICATION, WITH LONG-TERM CURRENT USE OF INSULIN: ICD-10-CM

## 2017-08-29 PROCEDURE — 99999 PR PBB SHADOW E&M-EST. PATIENT-LVL III: CPT | Mod: PBBFAC,,, | Performed by: FAMILY MEDICINE

## 2017-08-29 PROCEDURE — 99396 PREV VISIT EST AGE 40-64: CPT | Mod: S$GLB,,, | Performed by: FAMILY MEDICINE

## 2017-08-29 RX ORDER — INSULIN GLARGINE 100 [IU]/ML
INJECTION, SOLUTION SUBCUTANEOUS
Qty: 3 BOX | Refills: 3 | Status: SHIPPED | OUTPATIENT
Start: 2017-08-29 | End: 2018-01-02 | Stop reason: SDUPTHER

## 2017-08-29 RX ORDER — PEN NEEDLE, DIABETIC 30 GX3/16"
NEEDLE, DISPOSABLE MISCELLANEOUS
Qty: 100 EACH | Refills: 10 | Status: SHIPPED | OUTPATIENT
Start: 2017-08-29 | End: 2017-12-01 | Stop reason: SDUPTHER

## 2017-08-29 RX ORDER — ATORVASTATIN CALCIUM 40 MG/1
40 TABLET, FILM COATED ORAL DAILY
Qty: 90 TABLET | Refills: 3 | Status: SHIPPED | OUTPATIENT
Start: 2017-08-29 | End: 2018-07-31 | Stop reason: SDUPTHER

## 2017-08-29 RX ORDER — TADALAFIL 20 MG/1
TABLET ORAL
Qty: 6 TABLET | Refills: 5 | Status: SHIPPED | OUTPATIENT
Start: 2017-08-29 | End: 2018-07-31

## 2017-08-29 RX ORDER — NICOTINE 7MG/24HR
1 PATCH, TRANSDERMAL 24 HOURS TRANSDERMAL DAILY
Qty: 14 PATCH | Refills: 2 | Status: SHIPPED | OUTPATIENT
Start: 2017-08-29 | End: 2018-07-31

## 2017-08-29 RX ORDER — LOSARTAN POTASSIUM 100 MG/1
100 TABLET ORAL DAILY
Qty: 90 TABLET | Refills: 3 | Status: SHIPPED | OUTPATIENT
Start: 2017-08-29 | End: 2018-01-02 | Stop reason: SDUPTHER

## 2017-08-29 RX ORDER — AMOXICILLIN AND CLAVULANATE POTASSIUM 875; 125 MG/1; MG/1
1 TABLET, FILM COATED ORAL 2 TIMES DAILY
Qty: 20 TABLET | Refills: 0 | Status: SHIPPED | OUTPATIENT
Start: 2017-08-29 | End: 2017-09-08

## 2017-08-29 NOTE — PROGRESS NOTES
Subjective:      Patient ID: Epifanio Godoy is a 57 y.o. male.    Chief Complaint: Follow-up; Ear Fullness; and Annual Exam    Patient's coming in today for follow-up of chronic issues and prevention exam.    DM- improved. 6.4 on a1c. on lantus 25 units and victoza 1.8mg. also on invokana now. No problems.     He has controlled blood pressure today.   He denies any headaches or chest pain.  He has  been taking his losartan 100mg. Having some ear congestion.     The patient is a smoker.  Not quitting currently. Willing to work on now. Wanting to try patches.  He is smoking about 5 cigarettes a day.    Prior risk with hyperlipidemia was around 39%.  He is now back on his atorvastatin at 40 mg.  Labs are much improved as well.    He does report that he is having still some discomfort with his toe.  He feels that it's more of a callus related to issues.  He has good sensation but when he wears his boots it seems to bother him.  He's willing to see a podiatrist about it.  He deathly has loss of his toenails on his great toes bilaterally.  Appears to be fungal infection related as well.        Lab Results   Component Value Date    WBC 5.18 08/18/2017    HGB 12.8 (L) 08/18/2017    HCT 37.9 (L) 08/18/2017     08/18/2017    CHOL 124 08/18/2017    TRIG 108 08/18/2017    HDL 39 (L) 08/18/2017    ALT 19 08/18/2017    AST 21 08/18/2017     08/18/2017    K 4.0 08/18/2017     08/18/2017    CREATININE 1.0 08/18/2017    BUN 10 08/18/2017    CO2 22 (L) 08/18/2017    TSH 1.294 10/31/2016    PSA 0.18 08/18/2017    HGBA1C 6.4 (H) 08/18/2017       Review of Systems   Constitutional: Negative for chills, fatigue and unexpected weight change.   HENT: Positive for congestion and ear pain. Negative for hearing loss, postnasal drip, sneezing, sore throat and trouble swallowing.    Eyes: Negative for pain and visual disturbance.   Respiratory: Negative for cough and shortness of breath.    Cardiovascular: Negative for chest  pain and leg swelling.   Gastrointestinal: Negative for abdominal pain, constipation, diarrhea, nausea and vomiting.   Endocrine: Negative for cold intolerance and heat intolerance.   Genitourinary: Negative for difficulty urinating, dysuria and flank pain.   Musculoskeletal: Negative for arthralgias, back pain, joint swelling and neck pain.        Toe pain   Skin: Negative for color change and rash.   Neurological: Negative for dizziness, seizures and headaches.   Psychiatric/Behavioral: Negative for behavioral problems and dysphoric mood. The patient is not nervous/anxious.      Objective:     Physical Exam   Constitutional: He is oriented to person, place, and time. He appears well-developed and well-nourished. No distress.   HENT:   Head: Normocephalic and atraumatic.   Right Ear: External ear normal. Tympanic membrane is erythematous.   Left Ear: External ear normal. Tympanic membrane is erythematous.   Nose: Nose normal.   Mouth/Throat: Oropharynx is clear and moist.   Eyes: EOM are normal. Pupils are equal, round, and reactive to light.   Neck: Normal range of motion. Neck supple. No thyromegaly present.   Cardiovascular: Normal rate and regular rhythm.  Exam reveals no gallop and no friction rub.    No murmur heard.  Pulses:       Dorsalis pedis pulses are 2+ on the right side, and 2+ on the left side.   Pulmonary/Chest: Effort normal and breath sounds normal. No respiratory distress.   Abdominal: Soft. Bowel sounds are normal. He exhibits no distension. There is no tenderness. There is no rebound.   Musculoskeletal: Normal range of motion.        Right foot: There is deformity. There is normal range of motion.        Left foot: There is deformity. There is normal range of motion.   Feet:   Right Foot:   Protective Sensation: 4 sites tested. 4 sites sensed.   Skin Integrity: Positive for warmth, callus and dry skin. Negative for ulcer, blister, skin breakdown or erythema.   Left Foot:   Protective Sensation:  4 sites tested. 4 sites sensed.   Skin Integrity: Positive for warmth, callus and dry skin. Negative for ulcer, blister, skin breakdown or erythema.   Lymphadenopathy:     He has no cervical adenopathy.   Neurological: He is alert and oriented to person, place, and time. Coordination normal.   Skin: Skin is warm and dry.   Psychiatric: He has a normal mood and affect.   Vitals reviewed.    Assessment:     1. Routine general medical examination at a health care facility    2. Type 2 diabetes mellitus without complication, with long-term current use of insulin    3. Essential hypertension    4. Hyperlipidemia associated with type 2 diabetes mellitus    5. Anemia, unspecified type    6. Callus of foot    7. Chronic OBINNA (middle ear effusion), bilateral    8. Tobacco abuse      Plan:   Epifanio was seen today for follow-up, ear fullness and annual exam.    Diagnoses and all orders for this visit:    Routine general medical examination at a health care facility-labs reviewed today discussed health maintenance issues.-     Comprehensive metabolic panel; Future  -     CBC auto differential; Future  -     Lipid panel; Future  -     Hemoglobin A1c; Future  -     Iron and TIBC; Future    Type 2 diabetes mellitus without complication, with long-term current use of insulin-Controlled A1c at 6.4 continue with 3 medications including insulin    -     Ambulatory referral to Podiatry  -     liraglutide 0.6 mg/0.1 mL, 18 mg/3 mL, subq PNIJ (VICTOZA 3-JULIETTE) 0.6 mg/0.1 mL (18 mg/3 mL) PnIj; Inject 1.8 mg into the skin once daily. Please dispense 90 day supply if possible  -     Comprehensive metabolic panel; Future  -     CBC auto differential; Future  -     Lipid panel; Future  -     Hemoglobin A1c; Future  -     Iron and TIBC; Future    Essential hypertension-controlled with medications  -     Comprehensive metabolic panel; Future  -     CBC auto differential; Future  -     Lipid panel; Future  -     Hemoglobin A1c; Future  -     Iron and  "TIBC; Future    Hyperlipidemia associated with type 2 diabetes mellitus-controlled with atorvastatin 40 mg labs reviewed  -     atorvastatin (LIPITOR) 40 MG tablet; Take 1 tablet (40 mg total) by mouth once daily.  -     Comprehensive metabolic panel; Future  -     CBC auto differential; Future  -     Lipid panel; Future  -     Hemoglobin A1c; Future  -     Iron and TIBC; Future    Anemia, unspecified type-stable however checking iron levels with next visit  -     Comprehensive metabolic panel; Future  -     CBC auto differential; Future  -     Lipid panel; Future  -     Hemoglobin A1c; Future  -     Iron and TIBC; Future    Callus of foot-recurrent good dorsalis pedis pulses.  Refer to podiatrist  -     Ambulatory referral to Podiatry    Chronic OBINNA (middle ear effusion), bilateral-treat with antibiotics.  Not improving follow-up for additional evaluation    Tobacco abuse-patient ready to quit will try a nicotine patches.  Low-dose since smoking only 5 cigarettes per day  -     Comprehensive metabolic panel; Future  -     CBC auto differential; Future  -     Lipid panel; Future  -     Hemoglobin A1c; Future  -     Iron and TIBC; Future    Other orders  -     amoxicillin-clavulanate 875-125mg (AUGMENTIN) 875-125 mg per tablet; Take 1 tablet by mouth 2 (two) times daily.  -     canagliflozin (INVOKANA) 300 mg Tab tablet; Take 1 tablet (300 mg total) by mouth once daily.  -     insulin glargine (LANTUS SOLOSTAR) 100 unit/mL (3 mL) InPn pen; INJECT 15-30 UNITS INTO THE SKIN ONCE DAILY. Pt wants 90 day supply  -     losartan (COZAAR) 100 MG tablet; Take 1 tablet (100 mg total) by mouth once daily. 1 Tablet Oral Every day  -     pen needle, diabetic (BD INSULIN PEN NEEDLE UF MINI) 31 gauge x 3/16" Ndle; USE 3 TIMES PER DAY  -     tadalafil (CIALIS) 20 MG Tab; Use one tablet every 36 hours as needed for ED  -     nicotine (NICODERM CQ) 7 mg/24 hr; Place 1 patch onto the skin once daily.            Return in about 6 months " (around 2/28/2018) for dm, chol, bp.

## 2017-09-15 ENCOUNTER — TELEPHONE (OUTPATIENT)
Dept: INTERNAL MEDICINE | Facility: CLINIC | Age: 57
End: 2017-09-15

## 2017-09-15 NOTE — TELEPHONE ENCOUNTER
----- Message from Aneesh Calderon sent at 9/15/2017 11:34 AM CDT -----  Contact: [phone]  Pt Would like a 90 day  Refill called in on Rx medication Invokana.  Please call back at [phone].  Thx-AMH          Pt USes:  .  [id number] - [place] - [address]  [address]  [place] [address]  Phone: [phone] Fax: [phone]

## 2017-12-01 RX ORDER — PEN NEEDLE, DIABETIC 30 GX3/16"
NEEDLE, DISPOSABLE MISCELLANEOUS
Qty: 100 EACH | Refills: 10 | Status: SHIPPED | OUTPATIENT
Start: 2017-12-01 | End: 2018-07-31 | Stop reason: SDUPTHER

## 2017-12-01 NOTE — TELEPHONE ENCOUNTER
----- Message from Maritza Abraham sent at 12/1/2017 11:01 AM CST -----  Pt was calling to get the needles call in for his pens [facility] to [facility] [address] at [phone] in [place]. Pt can be reach at [phone].

## 2018-01-02 ENCOUNTER — OFFICE VISIT (OUTPATIENT)
Dept: INTERNAL MEDICINE | Facility: CLINIC | Age: 58
End: 2018-01-02
Payer: COMMERCIAL

## 2018-01-02 VITALS
SYSTOLIC BLOOD PRESSURE: 130 MMHG | TEMPERATURE: 99 F | HEIGHT: 70 IN | DIASTOLIC BLOOD PRESSURE: 80 MMHG | HEART RATE: 100 BPM | BODY MASS INDEX: 32.54 KG/M2 | WEIGHT: 227.31 LBS

## 2018-01-02 DIAGNOSIS — Z79.4 TYPE 2 DIABETES MELLITUS WITH DIABETIC DERMATITIS, WITH LONG-TERM CURRENT USE OF INSULIN: ICD-10-CM

## 2018-01-02 DIAGNOSIS — I10 ESSENTIAL HYPERTENSION: ICD-10-CM

## 2018-01-02 DIAGNOSIS — E11.620 TYPE 2 DIABETES MELLITUS WITH DIABETIC DERMATITIS, WITH LONG-TERM CURRENT USE OF INSULIN: ICD-10-CM

## 2018-01-02 DIAGNOSIS — L98.9 SKIN LESIONS: Primary | ICD-10-CM

## 2018-01-02 PROCEDURE — 99999 PR PBB SHADOW E&M-EST. PATIENT-LVL III: CPT | Mod: PBBFAC,,, | Performed by: PHYSICIAN ASSISTANT

## 2018-01-02 PROCEDURE — 99214 OFFICE O/P EST MOD 30 MIN: CPT | Mod: S$GLB,,, | Performed by: PHYSICIAN ASSISTANT

## 2018-01-02 RX ORDER — LOSARTAN POTASSIUM 100 MG/1
100 TABLET ORAL DAILY
Qty: 90 TABLET | Refills: 3 | Status: SHIPPED | OUTPATIENT
Start: 2018-01-02 | End: 2018-07-31 | Stop reason: SDUPTHER

## 2018-01-02 RX ORDER — CLOTRIMAZOLE AND BETAMETHASONE DIPROPIONATE 10; .64 MG/G; MG/G
CREAM TOPICAL 2 TIMES DAILY
Qty: 15 G | Refills: 1 | Status: SHIPPED | OUTPATIENT
Start: 2018-01-02 | End: 2018-01-16

## 2018-01-02 RX ORDER — INSULIN GLARGINE 100 [IU]/ML
INJECTION, SOLUTION SUBCUTANEOUS
Qty: 3 BOX | Refills: 3 | Status: SHIPPED | OUTPATIENT
Start: 2018-01-02 | End: 2018-07-31 | Stop reason: SDUPTHER

## 2018-01-02 NOTE — PROGRESS NOTES
"Subjective:       Patient ID: Epifanio Godoy is a 57 y.o. male.    Chief Complaint: Dry Skin    Rash   Location: lower legs, bilateral  The rash is characterized by dryness and itchiness (darkening in color ). He was exposed to nothing. Pertinent negatives include no congestion, diarrhea, fatigue or shortness of breath.       Past Medical History:   Diagnosis Date    Diabetes mellitus type II     Hypertension     Kidney stones     Type II or unspecified type diabetes mellitus without mention of complication, not stated as uncontrolled        Current Outpatient Prescriptions   Medication Sig Dispense Refill    atorvastatin (LIPITOR) 40 MG tablet Take 1 tablet (40 mg total) by mouth once daily. 90 tablet 3    canagliflozin (INVOKANA) 300 mg Tab tablet Take 1 tablet (300 mg total) by mouth once daily. 90 tablet 3    insulin glargine (LANTUS SOLOSTAR) 100 unit/mL (3 mL) InPn pen INJECT 15-30 UNITS INTO THE SKIN ONCE DAILY. Pt wants 90 day supply 3 Box 3    liraglutide 0.6 mg/0.1 mL, 18 mg/3 mL, subq PNIJ (VICTOZA 3-JULIETTE) 0.6 mg/0.1 mL (18 mg/3 mL) PnIj Inject 1.8 mg into the skin once daily. Please dispense 90 day supply if possible 9 mL 3    losartan (COZAAR) 100 MG tablet Take 1 tablet (100 mg total) by mouth once daily. 1 Tablet Oral Every day 90 tablet 3    nicotine (NICODERM CQ) 7 mg/24 hr Place 1 patch onto the skin once daily. 14 patch 2    pen needle, diabetic (BD INSULIN PEN NEEDLE UF MINI) 31 gauge x 3/16" Ndle USE 3 TIMES PER  each 10    tadalafil (CIALIS) 20 MG Tab Use one tablet every 36 hours as needed for ED 6 tablet 5    clotrimazole-betamethasone 1-0.05% (LOTRISONE) cream Apply topically 2 (two) times daily. 15 g 1     No current facility-administered medications for this visit.        Review of Systems   Constitutional: Negative for activity change, appetite change, chills, diaphoresis and fatigue.   HENT: Negative for congestion and dental problem.    Respiratory: Negative for " "shortness of breath, wheezing and stridor.    Cardiovascular: Negative for chest pain, palpitations and leg swelling.   Gastrointestinal: Negative for blood in stool, constipation, diarrhea and nausea.   Genitourinary: Negative for difficulty urinating, genital sores and hematuria.   Musculoskeletal: Negative for arthralgias, back pain and gait problem.   Skin: Positive for rash.   Allergic/Immunologic: Negative for environmental allergies and food allergies.   Hematological: Negative for adenopathy. Does not bruise/bleed easily.       Objective:   /80   Pulse 100   Temp 98.7 °F (37.1 °C) (Tympanic)   Ht 5' 10" (1.778 m)   Wt 103.1 kg (227 lb 4.7 oz)   BMI 32.61 kg/m²      Physical Exam   Constitutional: He is oriented to person, place, and time. He appears well-developed and well-nourished. No distress.   HENT:   Head: Normocephalic and atraumatic.   Mouth/Throat: Mucous membranes are normal.   Eyes: Conjunctivae are normal. Pupils are equal, round, and reactive to light.   Neck: Normal range of motion. Neck supple.   Cardiovascular: Normal rate.    Abdominal: Bowel sounds are normal.   Musculoskeletal: Normal range of motion.   Neurological: He is alert and oriented to person, place, and time. He has normal reflexes.   Skin: Skin is warm and dry.   Psychiatric: He has a normal mood and affect. His behavior is normal. Judgment and thought content normal.   Vitals reviewed.        Lab Results   Component Value Date    WBC 5.18 08/18/2017    HGB 12.8 (L) 08/18/2017    HCT 37.9 (L) 08/18/2017     08/18/2017    CHOL 124 08/18/2017    TRIG 108 08/18/2017    HDL 39 (L) 08/18/2017    ALT 19 08/18/2017    AST 21 08/18/2017     08/18/2017    K 4.0 08/18/2017     08/18/2017    CREATININE 1.0 08/18/2017    BUN 10 08/18/2017    CO2 22 (L) 08/18/2017    TSH 1.294 10/31/2016    PSA 0.18 08/18/2017    HGBA1C 6.4 (H) 08/18/2017       Assessment:       1. Skin lesions    2. Essential hypertension    3. " Type 2 diabetes mellitus with diabetic dermatitis, with long-term current use of insulin        Plan:   Skin lesions  -     Ambulatory referral to Dermatology    Skin lesions  -     Ambulatory referral to Dermatology  -     clotrimazole-betamethasone 1-0.05% (LOTRISONE) cream; Apply topically 2 (two) times daily.  Dispense: 15 g; Refill: 1  Essential hypertension  -     losartan (COZAAR) 100 MG tablet; Take 1 tablet (100 mg total) by mouth once daily. 1 Tablet Oral Every day  Dispense: 90 tablet; Refill: 3  Type 2 diabetes mellitus with diabetic dermatitis, with long-term current use of insulin  -     canagliflozin (INVOKANA) 300 mg Tab tablet; Take 1 tablet (300 mg total) by mouth once daily.  Dispense: 90 tablet; Refill: 3  -     insulin glargine (LANTUS SOLOSTAR) 100 unit/mL (3 mL) InPn pen; INJECT 15-30 UNITS INTO THE SKIN ONCE DAILY. 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

## 2018-01-11 ENCOUNTER — TELEPHONE (OUTPATIENT)
Dept: INTERNAL MEDICINE | Facility: CLINIC | Age: 58
End: 2018-01-11

## 2018-01-11 NOTE — TELEPHONE ENCOUNTER
Returned call and a recording came on advising to visit a website and there was no option to speak with a rep. I did a pa and received a denial status.aa

## 2018-01-11 NOTE — TELEPHONE ENCOUNTER
----- Message from Meet Alberto sent at 1/11/2018 11:13 AM CST -----  Contact: Rafaela ([employer] )   Rafaela ([employer] ) is requesting a call from nurse to discuss questions for prior authorization.          Please call  Rafaela ([employer] ) back at [phone]

## 2018-03-05 ENCOUNTER — LAB VISIT (OUTPATIENT)
Dept: LAB | Facility: HOSPITAL | Age: 58
End: 2018-03-05
Attending: FAMILY MEDICINE
Payer: COMMERCIAL

## 2018-03-05 DIAGNOSIS — I10 ESSENTIAL HYPERTENSION: ICD-10-CM

## 2018-03-05 DIAGNOSIS — Z72.0 TOBACCO ABUSE: ICD-10-CM

## 2018-03-05 DIAGNOSIS — E11.9 TYPE 2 DIABETES MELLITUS WITHOUT COMPLICATION, WITH LONG-TERM CURRENT USE OF INSULIN: ICD-10-CM

## 2018-03-05 DIAGNOSIS — D64.9 ANEMIA, UNSPECIFIED TYPE: ICD-10-CM

## 2018-03-05 DIAGNOSIS — E78.5 HYPERLIPIDEMIA ASSOCIATED WITH TYPE 2 DIABETES MELLITUS: ICD-10-CM

## 2018-03-05 DIAGNOSIS — Z79.4 TYPE 2 DIABETES MELLITUS WITHOUT COMPLICATION, WITH LONG-TERM CURRENT USE OF INSULIN: ICD-10-CM

## 2018-03-05 DIAGNOSIS — Z00.00 ROUTINE GENERAL MEDICAL EXAMINATION AT A HEALTH CARE FACILITY: ICD-10-CM

## 2018-03-05 DIAGNOSIS — E11.69 HYPERLIPIDEMIA ASSOCIATED WITH TYPE 2 DIABETES MELLITUS: ICD-10-CM

## 2018-03-05 LAB
ALBUMIN SERPL BCP-MCNC: 3.8 G/DL
ALP SERPL-CCNC: 81 U/L
ALT SERPL W/O P-5'-P-CCNC: 25 U/L
ANION GAP SERPL CALC-SCNC: 9 MMOL/L
AST SERPL-CCNC: 30 U/L
BASOPHILS # BLD AUTO: 0.01 K/UL
BASOPHILS NFR BLD: 0.2 %
BILIRUB SERPL-MCNC: 0.3 MG/DL
BUN SERPL-MCNC: 16 MG/DL
CALCIUM SERPL-MCNC: 9.8 MG/DL
CHLORIDE SERPL-SCNC: 107 MMOL/L
CHOLEST SERPL-MCNC: 162 MG/DL
CHOLEST/HDLC SERPL: 4.2 {RATIO}
CO2 SERPL-SCNC: 25 MMOL/L
CREAT SERPL-MCNC: 1 MG/DL
DIFFERENTIAL METHOD: ABNORMAL
EOSINOPHIL # BLD AUTO: 0.1 K/UL
EOSINOPHIL NFR BLD: 2.6 %
ERYTHROCYTE [DISTWIDTH] IN BLOOD BY AUTOMATED COUNT: 16.1 %
EST. GFR  (AFRICAN AMERICAN): >60 ML/MIN/1.73 M^2
EST. GFR  (NON AFRICAN AMERICAN): >60 ML/MIN/1.73 M^2
ESTIMATED AVG GLUCOSE: 140 MG/DL
GLUCOSE SERPL-MCNC: 110 MG/DL
HBA1C MFR BLD HPLC: 6.5 %
HCT VFR BLD AUTO: 40.9 %
HDLC SERPL-MCNC: 39 MG/DL
HDLC SERPL: 24.1 %
HGB BLD-MCNC: 12.6 G/DL
IMM GRANULOCYTES # BLD AUTO: 0.01 K/UL
IMM GRANULOCYTES NFR BLD AUTO: 0.2 %
IRON SERPL-MCNC: 44 UG/DL
LDLC SERPL CALC-MCNC: 95.4 MG/DL
LYMPHOCYTES # BLD AUTO: 2.1 K/UL
LYMPHOCYTES NFR BLD: 45.4 %
MCH RBC QN AUTO: 26.6 PG
MCHC RBC AUTO-ENTMCNC: 30.8 G/DL
MCV RBC AUTO: 86 FL
MONOCYTES # BLD AUTO: 0.5 K/UL
MONOCYTES NFR BLD: 11.1 %
NEUTROPHILS # BLD AUTO: 1.9 K/UL
NEUTROPHILS NFR BLD: 40.5 %
NONHDLC SERPL-MCNC: 123 MG/DL
NRBC BLD-RTO: 0 /100 WBC
PLATELET # BLD AUTO: 330 K/UL
PMV BLD AUTO: 9.2 FL
POTASSIUM SERPL-SCNC: 4.5 MMOL/L
PROT SERPL-MCNC: 7.4 G/DL
RBC # BLD AUTO: 4.74 M/UL
SATURATED IRON: 9 %
SODIUM SERPL-SCNC: 141 MMOL/L
TOTAL IRON BINDING CAPACITY: 475 UG/DL
TRANSFERRIN SERPL-MCNC: 321 MG/DL
TRIGL SERPL-MCNC: 138 MG/DL
WBC # BLD AUTO: 4.69 K/UL

## 2018-03-05 PROCEDURE — 80053 COMPREHEN METABOLIC PANEL: CPT

## 2018-03-05 PROCEDURE — 80061 LIPID PANEL: CPT

## 2018-03-05 PROCEDURE — 83036 HEMOGLOBIN GLYCOSYLATED A1C: CPT

## 2018-03-05 PROCEDURE — 85025 COMPLETE CBC W/AUTO DIFF WBC: CPT

## 2018-03-05 PROCEDURE — 36415 COLL VENOUS BLD VENIPUNCTURE: CPT | Mod: PO

## 2018-03-05 PROCEDURE — 83540 ASSAY OF IRON: CPT

## 2018-07-20 ENCOUNTER — PATIENT OUTREACH (OUTPATIENT)
Dept: ADMINISTRATIVE | Facility: HOSPITAL | Age: 58
End: 2018-07-20

## 2018-07-31 ENCOUNTER — LAB VISIT (OUTPATIENT)
Dept: LAB | Facility: HOSPITAL | Age: 58
End: 2018-07-31
Attending: FAMILY MEDICINE
Payer: COMMERCIAL

## 2018-07-31 ENCOUNTER — OFFICE VISIT (OUTPATIENT)
Dept: INTERNAL MEDICINE | Facility: CLINIC | Age: 58
End: 2018-07-31
Payer: COMMERCIAL

## 2018-07-31 VITALS
HEART RATE: 72 BPM | WEIGHT: 226 LBS | HEIGHT: 70 IN | SYSTOLIC BLOOD PRESSURE: 138 MMHG | TEMPERATURE: 97 F | DIASTOLIC BLOOD PRESSURE: 89 MMHG | BODY MASS INDEX: 32.35 KG/M2

## 2018-07-31 DIAGNOSIS — Z00.00 ROUTINE GENERAL MEDICAL EXAMINATION AT A HEALTH CARE FACILITY: ICD-10-CM

## 2018-07-31 DIAGNOSIS — M47.817 FACET ARTHRITIS, DEGENERATIVE, L5-S1 LEVEL, LUMBOSACRAL SPINE: ICD-10-CM

## 2018-07-31 DIAGNOSIS — E78.5 HYPERLIPIDEMIA ASSOCIATED WITH TYPE 2 DIABETES MELLITUS: ICD-10-CM

## 2018-07-31 DIAGNOSIS — Z79.4 TYPE 2 DIABETES MELLITUS WITHOUT COMPLICATION, WITH LONG-TERM CURRENT USE OF INSULIN: ICD-10-CM

## 2018-07-31 DIAGNOSIS — H66.003 ACUTE SUPPURATIVE OTITIS MEDIA OF BOTH EARS WITHOUT SPONTANEOUS RUPTURE OF TYMPANIC MEMBRANES, RECURRENCE NOT SPECIFIED: Primary | ICD-10-CM

## 2018-07-31 DIAGNOSIS — E11.69 HYPERLIPIDEMIA ASSOCIATED WITH TYPE 2 DIABETES MELLITUS: ICD-10-CM

## 2018-07-31 DIAGNOSIS — Z12.5 PROSTATE CANCER SCREENING: ICD-10-CM

## 2018-07-31 DIAGNOSIS — E11.9 TYPE 2 DIABETES MELLITUS WITHOUT COMPLICATION, WITH LONG-TERM CURRENT USE OF INSULIN: ICD-10-CM

## 2018-07-31 DIAGNOSIS — H91.90 HEARING LOSS, UNSPECIFIED HEARING LOSS TYPE, UNSPECIFIED LATERALITY: ICD-10-CM

## 2018-07-31 DIAGNOSIS — Z23 NEED FOR TDAP VACCINATION: ICD-10-CM

## 2018-07-31 DIAGNOSIS — H66.003 ACUTE SUPPURATIVE OTITIS MEDIA OF BOTH EARS WITHOUT SPONTANEOUS RUPTURE OF TYMPANIC MEMBRANES, RECURRENCE NOT SPECIFIED: ICD-10-CM

## 2018-07-31 LAB
ALBUMIN SERPL BCP-MCNC: 3.8 G/DL
ALBUMIN/CREAT UR: 7 UG/MG
ALP SERPL-CCNC: 86 U/L
ALT SERPL W/O P-5'-P-CCNC: 33 U/L
ANION GAP SERPL CALC-SCNC: 8 MMOL/L
AST SERPL-CCNC: 32 U/L
BASOPHILS # BLD AUTO: 0.02 K/UL
BASOPHILS NFR BLD: 0.4 %
BILIRUB SERPL-MCNC: 0.3 MG/DL
BUN SERPL-MCNC: 14 MG/DL
CALCIUM SERPL-MCNC: 9.2 MG/DL
CHLORIDE SERPL-SCNC: 107 MMOL/L
CHOLEST SERPL-MCNC: 148 MG/DL
CHOLEST/HDLC SERPL: 3.6 {RATIO}
CO2 SERPL-SCNC: 26 MMOL/L
COMPLEXED PSA SERPL-MCNC: 0.23 NG/ML
CREAT SERPL-MCNC: 0.8 MG/DL
CREAT UR-MCNC: 200 MG/DL
DIFFERENTIAL METHOD: ABNORMAL
EOSINOPHIL # BLD AUTO: 0.1 K/UL
EOSINOPHIL NFR BLD: 1.8 %
ERYTHROCYTE [DISTWIDTH] IN BLOOD BY AUTOMATED COUNT: 17.6 %
EST. GFR  (AFRICAN AMERICAN): >60 ML/MIN/1.73 M^2
EST. GFR  (NON AFRICAN AMERICAN): >60 ML/MIN/1.73 M^2
ESTIMATED AVG GLUCOSE: 134 MG/DL
GLUCOSE SERPL-MCNC: 88 MG/DL
HBA1C MFR BLD HPLC: 6.3 %
HCT VFR BLD AUTO: 40.8 %
HDLC SERPL-MCNC: 41 MG/DL
HDLC SERPL: 27.7 %
HGB BLD-MCNC: 12.2 G/DL
IMM GRANULOCYTES # BLD AUTO: 0.01 K/UL
IMM GRANULOCYTES NFR BLD AUTO: 0.2 %
LDLC SERPL CALC-MCNC: 84.8 MG/DL
LYMPHOCYTES # BLD AUTO: 2.2 K/UL
LYMPHOCYTES NFR BLD: 42.7 %
MCH RBC QN AUTO: 25.2 PG
MCHC RBC AUTO-ENTMCNC: 29.9 G/DL
MCV RBC AUTO: 84 FL
MICROALBUMIN UR DL<=1MG/L-MCNC: 14 UG/ML
MONOCYTES # BLD AUTO: 0.6 K/UL
MONOCYTES NFR BLD: 11.9 %
NEUTROPHILS # BLD AUTO: 2.2 K/UL
NEUTROPHILS NFR BLD: 43 %
NONHDLC SERPL-MCNC: 107 MG/DL
NRBC BLD-RTO: 0 /100 WBC
PLATELET # BLD AUTO: 394 K/UL
PMV BLD AUTO: 9.4 FL
POTASSIUM SERPL-SCNC: 4.1 MMOL/L
PROT SERPL-MCNC: 7.5 G/DL
RBC # BLD AUTO: 4.85 M/UL
SODIUM SERPL-SCNC: 141 MMOL/L
TRIGL SERPL-MCNC: 111 MG/DL
WBC # BLD AUTO: 5.13 K/UL

## 2018-07-31 PROCEDURE — 3075F SYST BP GE 130 - 139MM HG: CPT | Mod: CPTII,S$GLB,, | Performed by: FAMILY MEDICINE

## 2018-07-31 PROCEDURE — 3079F DIAST BP 80-89 MM HG: CPT | Mod: CPTII,S$GLB,, | Performed by: FAMILY MEDICINE

## 2018-07-31 PROCEDURE — 99396 PREV VISIT EST AGE 40-64: CPT | Mod: 25,S$GLB,, | Performed by: FAMILY MEDICINE

## 2018-07-31 PROCEDURE — 80061 LIPID PANEL: CPT

## 2018-07-31 PROCEDURE — 99214 OFFICE O/P EST MOD 30 MIN: CPT | Mod: 25,S$GLB,, | Performed by: FAMILY MEDICINE

## 2018-07-31 PROCEDURE — 3044F HG A1C LEVEL LT 7.0%: CPT | Mod: CPTII,S$GLB,, | Performed by: FAMILY MEDICINE

## 2018-07-31 PROCEDURE — 83036 HEMOGLOBIN GLYCOSYLATED A1C: CPT

## 2018-07-31 PROCEDURE — 90471 IMMUNIZATION ADMIN: CPT | Mod: S$GLB,,, | Performed by: FAMILY MEDICINE

## 2018-07-31 PROCEDURE — 82043 UR ALBUMIN QUANTITATIVE: CPT

## 2018-07-31 PROCEDURE — 80053 COMPREHEN METABOLIC PANEL: CPT

## 2018-07-31 PROCEDURE — 90715 TDAP VACCINE 7 YRS/> IM: CPT | Mod: S$GLB,,, | Performed by: FAMILY MEDICINE

## 2018-07-31 PROCEDURE — 36415 COLL VENOUS BLD VENIPUNCTURE: CPT | Mod: PO

## 2018-07-31 PROCEDURE — 3008F BODY MASS INDEX DOCD: CPT | Mod: CPTII,S$GLB,, | Performed by: FAMILY MEDICINE

## 2018-07-31 PROCEDURE — 99999 PR PBB SHADOW E&M-EST. PATIENT-LVL V: CPT | Mod: PBBFAC,,, | Performed by: FAMILY MEDICINE

## 2018-07-31 PROCEDURE — 85025 COMPLETE CBC W/AUTO DIFF WBC: CPT

## 2018-07-31 PROCEDURE — 84153 ASSAY OF PSA TOTAL: CPT

## 2018-07-31 RX ORDER — PEN NEEDLE, DIABETIC 30 GX3/16"
NEEDLE, DISPOSABLE MISCELLANEOUS
Qty: 100 EACH | Refills: 10 | Status: SHIPPED | OUTPATIENT
Start: 2018-07-31 | End: 2019-10-16 | Stop reason: SDUPTHER

## 2018-07-31 RX ORDER — ATORVASTATIN CALCIUM 40 MG/1
40 TABLET, FILM COATED ORAL DAILY
Qty: 90 TABLET | Refills: 3 | Status: SHIPPED | OUTPATIENT
Start: 2018-07-31 | End: 2019-10-16 | Stop reason: SDUPTHER

## 2018-07-31 RX ORDER — CIPROFLOXACIN HYDROCHLORIDE 3 MG/ML
SOLUTION/ DROPS OPHTHALMIC
Qty: 10 ML | Refills: 0 | Status: SHIPPED | OUTPATIENT
Start: 2018-07-31 | End: 2019-10-16

## 2018-07-31 RX ORDER — INSULIN GLARGINE 100 [IU]/ML
INJECTION, SOLUTION SUBCUTANEOUS
Qty: 45 ML | Refills: 3 | Status: SHIPPED | OUTPATIENT
Start: 2018-07-31 | End: 2019-10-16

## 2018-07-31 RX ORDER — AMOXICILLIN AND CLAVULANATE POTASSIUM 875; 125 MG/1; MG/1
1 TABLET, FILM COATED ORAL EVERY 12 HOURS
Qty: 20 TABLET | Refills: 0 | Status: SHIPPED | OUTPATIENT
Start: 2018-07-31 | End: 2019-10-16

## 2018-07-31 RX ORDER — LOSARTAN POTASSIUM 100 MG/1
100 TABLET ORAL DAILY
Qty: 90 TABLET | Refills: 3 | Status: SHIPPED | OUTPATIENT
Start: 2018-07-31 | End: 2019-10-16 | Stop reason: SDUPTHER

## 2018-07-31 NOTE — PROGRESS NOTES
"Subjective:      Patient ID: Epifanio Godoy is a 58 y.o. male.    Chief Complaint: Annual Exam; Back Pain; and Ear Fullness (left)    Disclaimer:  This note is prepared using voice recognition software and as such is likely to have errors and has not been proof read. Please contact me for questions.     Epifanio Godoy is a 58 y.o. male who presents today for a wellness exam.  Needing to update labs.  Needing to update Tdap.  Needing to update foot exam.  Needing to update eye exam.  Doing well otherwise.  Has diabetes hyperlipidemia hypertension and BMI of 32.  Former smoker.              Review of Systems   HENT: Negative for congestion, postnasal drip, sneezing, sore throat and trouble swallowing.    Eyes: Negative for pain and visual disturbance.   Respiratory: Negative for cough and shortness of breath.    Cardiovascular: Negative for chest pain and leg swelling.   Gastrointestinal: Negative for abdominal pain, constipation, diarrhea, nausea and vomiting.   Endocrine: Negative for cold intolerance and heat intolerance.   Genitourinary: Negative for difficulty urinating, dysuria and flank pain.   Musculoskeletal: Negative for joint swelling and neck pain.   Skin: Negative for color change and rash.   Neurological: Negative for dizziness, seizures and headaches.   Psychiatric/Behavioral: Negative for behavioral problems and dysphoric mood.     Objective:     Vitals:    07/31/18 1028 07/31/18 1041   BP: (!) 150/98  Comment: patient verbalized that he hasnt taken his bp meds in a few 138/89   Pulse: 72    Temp: 97 °F (36.1 °C)    Weight: 102.5 kg (225 lb 15.5 oz)    Height: 5' 10" (1.778 m)      Physical Exam   Constitutional: He is oriented to person, place, and time. He appears well-developed and well-nourished. No distress.   HENT:   Head: Normocephalic and atraumatic.   Nose: Nose normal.   Mouth/Throat: Oropharynx is clear and moist.   Eyes: EOM are normal. Pupils are equal, round, and reactive to light. "   Neck: Normal range of motion. Neck supple. No thyromegaly present.   Cardiovascular: Normal rate and regular rhythm.  Exam reveals no gallop and no friction rub.    No murmur heard.  Pulmonary/Chest: Effort normal and breath sounds normal. No respiratory distress.   Abdominal: Soft. Bowel sounds are normal.   Lymphadenopathy:     He has no cervical adenopathy.   Neurological: He is alert and oriented to person, place, and time. Coordination normal.   Skin: Skin is warm and dry.   Psychiatric: He has a normal mood and affect. His behavior is normal. Judgment and thought content normal.   Vitals reviewed.    Assessment:                         6. Routine general medical examination at a health care facility    7. Need for Tdap vaccination    8. Prostate cancer screening      Plan:   Routine general medical examination at a health care facility-labs obtained today.  Update Tdap.  Update eye exam.  Foot exam today.  -     Microalbumin/creatinine urine ratio  -     Lipid panel; Future  -     Hemoglobin A1c; Future  -     Comprehensive metabolic panel; Future  -     CBC auto differential; Future  -     PSA, Screening; Future    Type 2 diabetes mellitus with diabetic dermatitis, with long-term current use of insulin-obtaining additional labs today.  Sending medications to home delivery through [facility].  Doing well with Lantus, Invokana, and Victoza.  -     Microalbumin/creatinine urine ratio  -     Lipid panel; Future  -     Hemoglobin A1c; Future  -     Comprehensive metabolic panel; Future  -     CBC auto differential; Future  -     PSA, Screening; Future    Need for Tdap vaccination-update today      Prostate cancer screening-schedule PSA  -     PSA, Screening; Future    Other orders  -     (In Office Administered) Tdap Vaccine          Follow-up in about 6 months (around 1/31/2019) for chronic issues Dr Shepherd.

## 2018-07-31 NOTE — PROGRESS NOTES
Subjective:      Patient ID: Epifanio Godoy is a 58 y.o. male.    Chief Complaint: DM, chol, bpBack Pain; and Ear Fullness (left)    Disclaimer:  This note is prepared using voice recognition software and as such is likely to have errors and has not been proof read. Please contact me for questions.     Patient's coming in today for follow-up of chronic issues and ear pain and back pain.   DM- improved. 6.5 on a1c. on lantus 10 units and victoza 1.8mg. also on invokana 300mg, no further metformin.  No problems. Weight is down 2 lb.  Reports a.m. sugars are in the 100s.  Willing to do blood work today.  Needing eye exam.  Previously done with Dr. Crowell.  [facility].    He has elevated blood pressure initially but on repeat was 138/89.  Reports did not take his losartan this a.m..  No headaches no chest pain.    Has chronic ear congestion and this time more feeling of discomfort in his ears.  Has bilateral air infections noted today.  Willing to see ENT.  Having some decreased hearing as well.    Does have off and on back pain as well.  Believes it is related to his bed.  Travels back and forth to [place] for his work.  Has harder bed there and softer bed here.  Reviewed x-rays from 2016 which showed L5-S1 facet arthritis bilaterally.  In the past did better with physical therapy.  Declines medications today.    Cholesterol levels in the past have had LDL is under 100 on atorvastatin 40.  Willing to do additional blood work today.        The 10-year ASCVD risk score (Matias SORAYA Jr., et al., 2013) is: 37.4%    Values used to calculate the score:      Age: 58 years      Sex: Male      Is Non- : Yes      Diabetic: Yes      Tobacco smoker: Yes      Systolic Blood Pressure: 138 mmHg      Is BP treated: Yes      HDL Cholesterol: 39 mg/dL      Total Cholesterol: 162 mg/dL          Lab Results   Component Value Date    WBC 4.69 03/05/2018    HGB 12.6 (L) 03/05/2018    HCT 40.9  "03/05/2018     03/05/2018    CHOL 162 03/05/2018    TRIG 138 03/05/2018    HDL 39 (L) 03/05/2018    ALT 25 03/05/2018    AST 30 03/05/2018     03/05/2018    K 4.5 03/05/2018     03/05/2018    CREATININE 1.0 03/05/2018    BUN 16 03/05/2018    CO2 25 03/05/2018    TSH 1.294 10/31/2016    PSA 0.18 08/18/2017    HGBA1C 6.5 (H) 03/05/2018       Review of Systems   Constitutional: Negative for activity change, appetite change, fever and unexpected weight change.   HENT: Positive for congestion, ear pain and hearing loss. Negative for sinus pain and sinus pressure.    Musculoskeletal: Positive for arthralgias and back pain.     Objective:     Vitals:    07/31/18 1028 07/31/18 1041   BP: (!) 150/98  Comment: patient verbalized that he hasnt taken his bp meds in a few 138/89   Pulse: 72    Temp: 97 °F (36.1 °C)    Weight: 102.5 kg (225 lb 15.5 oz)    Height: 5' 10" (1.778 m)      Physical Exam   Constitutional: No distress.   O AAM   HENT:   Right Ear: External ear normal. There is drainage, swelling and tenderness. A middle ear effusion is present. Decreased hearing is noted.   Left Ear: External ear normal. There is drainage, swelling and tenderness. A middle ear effusion is present. Decreased hearing is noted.   Mouth/Throat: Mucous membranes are normal.   Cardiovascular:   Pulses:       Dorsalis pedis pulses are 2+ on the right side, and 2+ on the left side.   Musculoskeletal:        Lumbar back: He exhibits decreased range of motion, tenderness and bony tenderness.        Back:         Right foot: There is normal range of motion and no deformity.        Left foot: There is normal range of motion and no deformity.   Feet:   Right Foot:   Protective Sensation: 4 sites tested. 4 sites sensed.   Skin Integrity: Positive for warmth. Negative for ulcer, blister, skin breakdown, erythema, callus or dry skin.   Left Foot:   Protective Sensation: 4 sites tested. 4 sites sensed.   Skin Integrity: Positive for " warmth. Negative for ulcer, blister, skin breakdown, erythema, callus or dry skin.   Vitals reviewed.    Assessment:     1. Acute suppurative otitis media of both ears without spontaneous rupture of tympanic membranes, recurrence not specified    2. Hearing loss, unspecified hearing loss type, unspecified laterality    3. Facet arthritis, degenerative, L5-S1 level, lumbosacral spine    4. Hyperlipidemia associated with type 2 diabetes mellitus    5. Type 2 diabetes mellitus without complication, with long-term current use of insulin    6. Routine general medical examination at a health care facility    7. Need for Tdap vaccination    8. Prostate cancer screening      Plan:   Epifanio was seen today for annual exam, back pain and ear fullness.    Diagnoses and all orders for this visit:      Acute suppurative otitis media of both ears without spontaneous rupture of tympanic membranes, recurrence not specified-bilaterally starting on Cipro drops and Augmentin.  Have patient follow up with ENT in 1 week and audiology for further evaluation.  -     Ambulatory referral to ENT  -     ciprofloxacin HCl (CILOXAN) 0.3 % ophthalmic solution; 3 drops in bilateral ears 3 times a day. Ok for eye or ear version, whichever cheaper  -     amoxicillin-clavulanate 875-125mg (AUGMENTIN) 875-125 mg per tablet; Take 1 tablet by mouth every 12 (twelve) hours.  -     Microalbumin/creatinine urine ratio  -     Lipid panel; Future  -     Hemoglobin A1c; Future  -     Comprehensive metabolic panel; Future  -     CBC auto differential; Future  -     PSA, Screening; Future  -     Ambulatory Referral to Audiology    Hearing loss, unspecified hearing loss type, unspecified laterality-bilaterally starting on Cipro drops and Augmentin.  Have patient follow up with ENT in 1 week and audiology for further evaluation.    -     Ambulatory referral to ENT  -     ciprofloxacin HCl (CILOXAN) 0.3 % ophthalmic solution; 3 drops in bilateral ears 3 times a day.  "Ok for eye or ear version, whichever cheaper  -     amoxicillin-clavulanate 875-125mg (AUGMENTIN) 875-125 mg per tablet; Take 1 tablet by mouth every 12 (twelve) hours.  -     Microalbumin/creatinine urine ratio  -     Lipid panel; Future  -     Hemoglobin A1c; Future  -     Comprehensive metabolic panel; Future  -     CBC auto differential; Future  -     PSA, Screening; Future  -     Ambulatory Referral to Audiology    Hyperlipidemia associated with type 2 diabetes mellitus - obtain labs today currently on statin therapy high risk due to being diabetic and prior smoker.  -     atorvastatin (LIPITOR) 40 MG tablet; Take 1 tablet (40 mg total) by mouth once daily.  -     Microalbumin/creatinine urine ratio  -     Lipid panel; Future  -     Hemoglobin A1c; Future  -     Comprehensive metabolic panel; Future  -     CBC auto differential; Future  -     PSA, Screening; Future    Type 2 diabetes mellitus without complication, with long-term current use of insulin- at 6.5. Check labs today. Setup eye exam. Foot exam today. High risk for complications with ear exam.   -     canagliflozin (INVOKANA) 300 mg Tab tablet; Take 1 tablet (300 mg total) by mouth once daily.  -     insulin glargine (LANTUS SOLOSTAR U-100 INSULIN) 100 unit/mL (3 mL) InPn pen; INJECT 15-30 UNITS INTO THE SKIN ONCE DAILY. Pt wants 90 day supply  -     liraglutide 0.6 mg/0.1 mL, 18 mg/3 mL, subq PNIJ (VICTOZA 3-JULIETTE) 0.6 mg/0.1 mL (18 mg/3 mL) PnIj; Inject 1.8 mg into the skin once daily. Please dispense 90 day supply if possible  -     losartan (COZAAR) 100 MG tablet; Take 1 tablet (100 mg total) by mouth once daily. 1 Tablet Oral Every day  -     pen needle, diabetic (BD ULTRA-FINE MINI PEN NEEDLE) 31 gauge x 3/16" Ndle; USE 3 TIMES PER DAY  -     Microalbumin/creatinine urine ratio  -     Lipid panel; Future  -     Hemoglobin A1c; Future  -     Comprehensive metabolic panel; Future  -     CBC auto differential; Future  -     PSA, Screening; Future  -   "   Ambulatory referral to Optometry        L5-S1 Facet arthritis-patient willing to work on changing his mattress.  Declines physical therapy at this time.      Follow-up in about 6 months (around 1/31/2019) for chronic issues Dr Shepherd.

## 2018-08-17 DIAGNOSIS — Z79.4 TYPE 2 DIABETES MELLITUS WITHOUT COMPLICATION, WITH LONG-TERM CURRENT USE OF INSULIN: ICD-10-CM

## 2018-08-17 DIAGNOSIS — E11.9 TYPE 2 DIABETES MELLITUS WITHOUT COMPLICATION, WITH LONG-TERM CURRENT USE OF INSULIN: ICD-10-CM

## 2018-08-17 NOTE — TELEPHONE ENCOUNTER
----- Message from James Osman sent at 8/17/2018  2:48 PM CDT -----  Contact: Pt   Pt is call regarding a script refill on canagliflozin (INVOKANA) 300 mg Tab tablet. Pt states that a Authoization is needed junaid Dr. Shepherd for this script. .[phone] (home)         .  [id number] - [place] - [address]  [address]  [place] [address]  Phone: [phone] Fax: [phone]

## 2018-12-26 DIAGNOSIS — Z79.4 TYPE 2 DIABETES MELLITUS WITHOUT COMPLICATION, WITH LONG-TERM CURRENT USE OF INSULIN: ICD-10-CM

## 2018-12-26 DIAGNOSIS — E11.9 TYPE 2 DIABETES MELLITUS WITHOUT COMPLICATION, WITH LONG-TERM CURRENT USE OF INSULIN: ICD-10-CM

## 2018-12-26 NOTE — TELEPHONE ENCOUNTER
----- Message from Saba Redd sent at 12/26/2018  2:15 PM CST -----  Contact: pt  He's calling in regards to refill for diabetes medication     Pls call pt back at [phone] (home)         [facility] [address] [place] [address] [address] [facility] [address] [facility] [address]  Phone: [phone] Fax: [phone]

## 2018-12-31 ENCOUNTER — TELEPHONE (OUTPATIENT)
Dept: INTERNAL MEDICINE | Facility: CLINIC | Age: 58
End: 2018-12-31

## 2019-01-23 ENCOUNTER — PATIENT OUTREACH (OUTPATIENT)
Dept: ADMINISTRATIVE | Facility: HOSPITAL | Age: 59
End: 2019-01-23

## 2019-01-23 DIAGNOSIS — E78.5 HYPERLIPIDEMIA ASSOCIATED WITH TYPE 2 DIABETES MELLITUS: Primary | ICD-10-CM

## 2019-01-23 DIAGNOSIS — E11.69 HYPERLIPIDEMIA ASSOCIATED WITH TYPE 2 DIABETES MELLITUS: Primary | ICD-10-CM

## 2019-01-24 ENCOUNTER — TELEPHONE (OUTPATIENT)
Dept: INTERNAL MEDICINE | Facility: CLINIC | Age: 59
End: 2019-01-24

## 2019-03-08 DIAGNOSIS — Z79.4 TYPE 2 DIABETES MELLITUS WITHOUT COMPLICATION, WITH LONG-TERM CURRENT USE OF INSULIN: ICD-10-CM

## 2019-03-08 DIAGNOSIS — E11.9 TYPE 2 DIABETES MELLITUS WITHOUT COMPLICATION, WITH LONG-TERM CURRENT USE OF INSULIN: ICD-10-CM

## 2019-03-08 RX ORDER — INSULIN GLARGINE 100 [IU]/ML
INJECTION, SOLUTION SUBCUTANEOUS
Qty: 30 ML | Refills: 0 | OUTPATIENT
Start: 2019-03-08

## 2019-03-08 RX ORDER — LOSARTAN POTASSIUM 100 MG/1
TABLET ORAL
Qty: 90 TABLET | Refills: 0 | OUTPATIENT
Start: 2019-03-08

## 2019-03-08 NOTE — TELEPHONE ENCOUNTER
----- Message from Rosenuria Castillo sent at 3/8/2019  4:07 PM CST -----  Contact: Spouse  Type:  RX Refill Request    Who Called: mrs. lazo  Refill or New Rx:refill  RX Name and Strength:canagliflozin (INVOKANA) 300 mg Tab tablet///liraglutide 0.6 mg/0.1 mL, 18 mg/3 mL, subq PNIJ (VICTOZA 3-JULIETTE) 0.6 mg/0.1 mL (18 mg/3 mL) PnIj  How is the patient currently taking it? (ex. 1XDay):n/a  Is this a 30 day or 90 day RX:90  Preferred Pharmacy with phone number:  [facility] [address] [facility] [address] [facility] [address]  [address]  [facility] [address]  Phone: [phone] Fax: [phone]    Local or Mail Order:local  Ordering Provider:dr valencia  Would the patient rather a call back or a response via [facility]? call  Best Call Back Number:[phone]  Additional Information: Patient is completely out of his meds and is out of town for work in [place]. His wife will be visiting with him and is willing to pick it up from any [facility].

## 2019-03-11 ENCOUNTER — TELEPHONE (OUTPATIENT)
Dept: INTERNAL MEDICINE | Facility: CLINIC | Age: 59
End: 2019-03-11

## 2019-03-11 NOTE — TELEPHONE ENCOUNTER
----- Message from Sania Vences sent at 3/11/2019  8:50 AM CDT -----  Type:  RX Refill Request    Who Called: pt Epifanio                                                                                                                      Refill or New Rx refill  RX Name and Strength: Invokamo???  How is the patient currently taking it? (ex. 1XDay):Takes once daily  Is this a 30 day or 90 day RX: 90 day  Preferred Pharmacy with phone number: [facility] [address] in [place], [place]  Local or Mail Order: Local  Ordering Provider: Dr Shepherd  Would the patient rather a call back or a response via [facility]?  Call back  Best Call Back Number: [phone]  Additional Information:  Please call when sent in//fer//evette

## 2019-03-12 ENCOUNTER — LAB VISIT (OUTPATIENT)
Dept: LAB | Facility: HOSPITAL | Age: 59
End: 2019-03-12
Attending: FAMILY MEDICINE
Payer: COMMERCIAL

## 2019-03-12 DIAGNOSIS — E11.69 HYPERLIPIDEMIA ASSOCIATED WITH TYPE 2 DIABETES MELLITUS: ICD-10-CM

## 2019-03-12 DIAGNOSIS — E78.5 HYPERLIPIDEMIA ASSOCIATED WITH TYPE 2 DIABETES MELLITUS: ICD-10-CM

## 2019-03-12 LAB
ESTIMATED AVG GLUCOSE: 131 MG/DL
HBA1C MFR BLD HPLC: 6.2 %

## 2019-03-12 PROCEDURE — 36415 COLL VENOUS BLD VENIPUNCTURE: CPT | Mod: PO

## 2019-03-12 PROCEDURE — 83036 HEMOGLOBIN GLYCOSYLATED A1C: CPT

## 2019-03-19 ENCOUNTER — TELEPHONE (OUTPATIENT)
Dept: INTERNAL MEDICINE | Facility: CLINIC | Age: 59
End: 2019-03-19

## 2019-03-21 ENCOUNTER — TELEPHONE (OUTPATIENT)
Dept: INTERNAL MEDICINE | Facility: CLINIC | Age: 59
End: 2019-03-21

## 2019-03-21 NOTE — TELEPHONE ENCOUNTER
Tried calling pt, no answer. Left message explaining that we have been trying to reach him regarding the medication xarelto, that we did initiate and complete a prior authorization on it. However insurance still denied it. We have now sent in jardiance for him to start taking as this was a preferred medicine according to his insurance.

## 2019-03-21 NOTE — TELEPHONE ENCOUNTER
----- Message from Crystal Frances sent at 3/21/2019  9:10 AM CDT -----  Contact: pt   Pt states that he need a referral sent to his insurance company stating that he need that med.  .[phone] (home)

## 2019-03-24 ENCOUNTER — PATIENT MESSAGE (OUTPATIENT)
Dept: INTERNAL MEDICINE | Facility: CLINIC | Age: 59
End: 2019-03-24

## 2019-03-24 DIAGNOSIS — E11.9 TYPE 2 DIABETES MELLITUS WITHOUT COMPLICATION, WITH LONG-TERM CURRENT USE OF INSULIN: ICD-10-CM

## 2019-03-24 DIAGNOSIS — Z79.4 TYPE 2 DIABETES MELLITUS WITHOUT COMPLICATION, WITH LONG-TERM CURRENT USE OF INSULIN: ICD-10-CM

## 2019-03-25 ENCOUNTER — TELEPHONE (OUTPATIENT)
Dept: INTERNAL MEDICINE | Facility: CLINIC | Age: 59
End: 2019-03-25

## 2019-03-25 RX ORDER — LIRAGLUTIDE 6 MG/ML
INJECTION SUBCUTANEOUS
Qty: 27 ML | Refills: 0 | Status: SHIPPED | OUTPATIENT
Start: 2019-03-25 | End: 2019-10-16

## 2019-03-25 NOTE — TELEPHONE ENCOUNTER
----- Message from Preethi Sanderson sent at 3/25/2019 11:20 AM CDT -----  Contact: Pt  Type:  Sooner Apoointment Request    Caller is requesting a sooner appointment.  Caller declined first available appointment listed below.  Caller will not accept being placed on the waitlist and is requesting a message be sent to doctor.  Name of Caller: Epifanio Godoy  When is the first available appointment? 06/04/2019  Symptoms: Annual/check up  Would the patient rather a call back or a response via [facility]? Callback  Best Call Back Number: [phone]  Additional Information:

## 2019-03-25 NOTE — TELEPHONE ENCOUNTER
Sent in victoza. Informed pt that I sent in jardiance instead of invokana per formulary change with insurance. Please make him aware.

## 2019-04-18 ENCOUNTER — OFFICE VISIT (OUTPATIENT)
Dept: INTERNAL MEDICINE | Facility: CLINIC | Age: 59
End: 2019-04-18
Payer: COMMERCIAL

## 2019-04-18 VITALS
HEIGHT: 71 IN | HEART RATE: 88 BPM | RESPIRATION RATE: 20 BRPM | SYSTOLIC BLOOD PRESSURE: 130 MMHG | BODY MASS INDEX: 30.77 KG/M2 | WEIGHT: 219.81 LBS | DIASTOLIC BLOOD PRESSURE: 90 MMHG | TEMPERATURE: 97 F

## 2019-04-18 DIAGNOSIS — E08.22 DIABETES MELLITUS DUE TO UNDERLYING CONDITION, CONTROLLED, WITH CHRONIC KIDNEY DISEASE, UNSPECIFIED CKD STAGE, UNSPECIFIED WHETHER LONG TERM INSULIN USE: ICD-10-CM

## 2019-04-18 DIAGNOSIS — I10 ESSENTIAL HYPERTENSION: Primary | ICD-10-CM

## 2019-04-18 PROCEDURE — 3080F DIAST BP >= 90 MM HG: CPT | Mod: CPTII,S$GLB,, | Performed by: PHYSICIAN ASSISTANT

## 2019-04-18 PROCEDURE — 3080F PR MOST RECENT DIASTOLIC BLOOD PRESSURE >= 90 MM HG: ICD-10-PCS | Mod: CPTII,S$GLB,, | Performed by: PHYSICIAN ASSISTANT

## 2019-04-18 PROCEDURE — 99999 PR PBB SHADOW E&M-EST. PATIENT-LVL III: CPT | Mod: PBBFAC,,, | Performed by: PHYSICIAN ASSISTANT

## 2019-04-18 PROCEDURE — 3008F PR BODY MASS INDEX (BMI) DOCUMENTED: ICD-10-PCS | Mod: CPTII,S$GLB,, | Performed by: PHYSICIAN ASSISTANT

## 2019-04-18 PROCEDURE — 99214 PR OFFICE/OUTPT VISIT, EST, LEVL IV, 30-39 MIN: ICD-10-PCS | Mod: S$GLB,,, | Performed by: PHYSICIAN ASSISTANT

## 2019-04-18 PROCEDURE — 99214 OFFICE O/P EST MOD 30 MIN: CPT | Mod: S$GLB,,, | Performed by: PHYSICIAN ASSISTANT

## 2019-04-18 PROCEDURE — 3008F BODY MASS INDEX DOCD: CPT | Mod: CPTII,S$GLB,, | Performed by: PHYSICIAN ASSISTANT

## 2019-04-18 PROCEDURE — 3075F PR MOST RECENT SYSTOLIC BLOOD PRESS GE 130-139MM HG: ICD-10-PCS | Mod: CPTII,S$GLB,, | Performed by: PHYSICIAN ASSISTANT

## 2019-04-18 PROCEDURE — 3075F SYST BP GE 130 - 139MM HG: CPT | Mod: CPTII,S$GLB,, | Performed by: PHYSICIAN ASSISTANT

## 2019-04-18 PROCEDURE — 99999 PR PBB SHADOW E&M-EST. PATIENT-LVL III: ICD-10-PCS | Mod: PBBFAC,,, | Performed by: PHYSICIAN ASSISTANT

## 2019-04-18 RX ORDER — LEVOCETIRIZINE DIHYDROCHLORIDE 5 MG/1
5 TABLET, FILM COATED ORAL NIGHTLY
Qty: 30 TABLET | Refills: 11 | Status: SHIPPED | OUTPATIENT
Start: 2019-04-18 | End: 2019-10-16

## 2019-04-18 RX ORDER — FLUTICASONE PROPIONATE 50 MCG
2 SPRAY, SUSPENSION (ML) NASAL DAILY
Qty: 1 BOTTLE | Refills: 0 | Status: SHIPPED | OUTPATIENT
Start: 2019-04-18 | End: 2019-05-21 | Stop reason: SDUPTHER

## 2019-04-18 NOTE — PROGRESS NOTES
"Subjective:       Patient ID: Epifanio Godoy is a 59 y.o. male.    Chief Complaint: Follow-up (from lab work)    HPI     Patient comes in today for follow up labs     He has DM II, hypertension, HLD     He is due for several HM tabs   DM is well controlled, hypertension needsbetter control       Recently seen in  for sinus issues   Health Maintenance Due   Topic Date Due    Eye Exam  01/17/2018    Influenza Vaccine  08/01/2018    Colonoscopy  10/16/2018       Past Medical History:   Diagnosis Date    Diabetes mellitus type II     Hypertension     Kidney stones     Type II or unspecified type diabetes mellitus without mention of complication, not stated as uncontrolled        Current Outpatient Medications   Medication Sig Dispense Refill    amoxicillin-clavulanate 875-125mg (AUGMENTIN) 875-125 mg per tablet Take 1 tablet by mouth every 12 (twelve) hours. 20 tablet 0    atorvastatin (LIPITOR) 40 MG tablet Take 1 tablet (40 mg total) by mouth once daily. 90 tablet 3    ciprofloxacin HCl (CILOXAN) 0.3 % ophthalmic solution 3 drops in bilateral ears 3 times a day. Ok for eye or ear version, whichever cheaper 10 mL 0    empagliflozin (JARDIANCE) 25 mg Tab Take 25 mg by mouth once daily. 30 tablet 11    fluticasone (FLONASE) 50 mcg/actuation nasal spray 2 sprays (100 mcg total) by Each Nare route once daily. 1 Bottle 0    insulin glargine (LANTUS SOLOSTAR U-100 INSULIN) 100 unit/mL (3 mL) InPn pen INJECT 15-30 UNITS INTO THE SKIN ONCE DAILY. 45 mL 3    levocetirizine (XYZAL) 5 MG tablet Take 1 tablet (5 mg total) by mouth every evening. 30 tablet 11    liraglutide 0.6 mg/0.1 mL, 18 mg/3 mL, subq PNIJ (VICTOZA 3-JULIETTE) 0.6 mg/0.1 mL (18 mg/3 mL) PnIj Inject 1.8 mg into the skin once daily. 27 mL 0    losartan (COZAAR) 100 MG tablet Take 1 tablet (100 mg total) by mouth once daily. 1 Tablet Oral Every day 90 tablet 3    pen needle, diabetic (BD ULTRA-FINE MINI PEN NEEDLE) 31 gauge x 3/16" Ndle USE 3 TIMES " "PER  each 10    VICTOZA 3-JULIETTE 0.6 mg/0.1 mL (18 mg/3 mL) PnIj INJECT 1.8 MG UNDER THE SKIN ONCE DAILY 27 mL 0     No current facility-administered medications for this visit.        Review of Systems    Objective:   BP (!) 130/90   Pulse 88   Temp 97.4 °F (36.3 °C) (Tympanic)   Resp 20   Ht 5' 11" (1.803 m)   Wt 99.7 kg (219 lb 12.8 oz)   BMI 30.66 kg/m²      Physical Exam      Lab Results   Component Value Date    WBC 5.13 07/31/2018    HGB 12.2 (L) 07/31/2018    HCT 40.8 07/31/2018     (H) 07/31/2018    CHOL 148 07/31/2018    TRIG 111 07/31/2018    HDL 41 07/31/2018    ALT 33 07/31/2018    AST 32 07/31/2018     07/31/2018    K 4.1 07/31/2018     07/31/2018    CREATININE 0.8 07/31/2018    BUN 14 07/31/2018    CO2 26 07/31/2018    TSH 1.294 10/31/2016    PSA 0.23 07/31/2018    HGBA1C 6.2 (H) 03/12/2019       Assessment:       1. Essential hypertension    2. Diabetes mellitus due to underlying condition, controlled, with chronic kidney disease, unspecified CKD stage, unspecified whether long term insulin use        Plan:   Essential hypertension  -     Hypertension Digital Medicine (HDMP) Enrollment Order  -     Hypertension Digital Medicine (HDMP): Assign Onboarding Questionnaires    Diabetes mellitus due to underlying condition, controlled, with chronic kidney disease, unspecified CKD stage, unspecified whether long term insulin use  -     Diabetes Digital Medicine (DDMP) Enrollment Order  -     Diabetes Digital Medicine (DDMP): Assign Onboarding Questionnaires  -     Hemoglobin A1c; Future; Expected date: 07/17/2019    Other orders  -     levocetirizine (XYZAL) 5 MG tablet; Take 1 tablet (5 mg total) by mouth every evening.  Dispense: 30 tablet; Refill: 11  -     fluticasone (FLONASE) 50 mcg/actuation nasal spray; 2 sprays (100 mcg total) by Each Nare route once daily.  Dispense: 1 Bottle; Refill: 0    Needs follow up with PCP in July for annual   Signed up for digital programs " today

## 2019-04-29 ENCOUNTER — PATIENT OUTREACH (OUTPATIENT)
Dept: ADMINISTRATIVE | Facility: HOSPITAL | Age: 59
End: 2019-04-29

## 2019-05-22 RX ORDER — FLUTICASONE PROPIONATE 50 MCG
SPRAY, SUSPENSION (ML) NASAL
Qty: 16 ML | Refills: 0 | Status: SHIPPED | OUTPATIENT
Start: 2019-05-22 | End: 2019-10-16 | Stop reason: SDUPTHER

## 2019-06-24 ENCOUNTER — TELEPHONE (OUTPATIENT)
Dept: INTERNAL MEDICINE | Facility: CLINIC | Age: 59
End: 2019-06-24

## 2019-06-24 NOTE — TELEPHONE ENCOUNTER
Called [facility] and spoke with rae jo who advised he was faxing over pa form  And turn around is between 48-72 hours and patient advised and expressed understanding.mendoza

## 2019-06-24 NOTE — TELEPHONE ENCOUNTER
----- Message from Twila Reed sent at 6/24/2019  7:58 AM CDT -----  Contact: Patient  Type:  Needs Medical Advice    Who Called: Patient  Symptoms (please be specific):    How long has patient had these symptoms:    Pharmacy name and phone #:  [facility] in [place]  Would the patient rather a call back or a response via [facility]? call  Best Call Back Number: [phone]  Additional Information: Patient states that his insurance requires a prior auth on Jardiance and the nasal spray.

## 2019-06-25 ENCOUNTER — TELEPHONE (OUTPATIENT)
Dept: INTERNAL MEDICINE | Facility: CLINIC | Age: 59
End: 2019-06-25

## 2019-06-25 NOTE — TELEPHONE ENCOUNTER
----- Message from Ivon Kaur sent at 6/25/2019  4:47 PM CDT -----  Contact: Patient  .Type:  Patient Returning Call    Who Called: Epifanio- Patient  Who Left Message for Patient: Medication???  Does the patient know what this is regarding?:No  Would the patient rather a call back or a response via [facility]? Call  Best Call Back Number:[phone]  Additional Information:

## 2019-07-19 ENCOUNTER — LAB VISIT (OUTPATIENT)
Dept: LAB | Facility: HOSPITAL | Age: 59
End: 2019-07-19
Attending: PHYSICIAN ASSISTANT
Payer: COMMERCIAL

## 2019-07-19 DIAGNOSIS — E08.22 DIABETES MELLITUS DUE TO UNDERLYING CONDITION, CONTROLLED, WITH CHRONIC KIDNEY DISEASE, UNSPECIFIED CKD STAGE, UNSPECIFIED WHETHER LONG TERM INSULIN USE: ICD-10-CM

## 2019-07-19 LAB
ESTIMATED AVG GLUCOSE: 128 MG/DL (ref 68–131)
HBA1C MFR BLD HPLC: 6.1 % (ref 4–5.6)

## 2019-07-19 PROCEDURE — 36415 COLL VENOUS BLD VENIPUNCTURE: CPT | Mod: PO

## 2019-07-19 PROCEDURE — 83036 HEMOGLOBIN GLYCOSYLATED A1C: CPT

## 2019-08-07 ENCOUNTER — TELEPHONE (OUTPATIENT)
Dept: INTERNAL MEDICINE | Facility: CLINIC | Age: 59
End: 2019-08-07

## 2019-08-13 ENCOUNTER — TELEPHONE (OUTPATIENT)
Dept: INTERNAL MEDICINE | Facility: CLINIC | Age: 59
End: 2019-08-13

## 2019-08-13 NOTE — TELEPHONE ENCOUNTER
Called and spoke with cover [employer] meds they will fax over the PA info on pts victoza.     Tried calling pt, no answer. Left message asking for return call to find out if he is still taking the victoza.

## 2019-08-13 NOTE — TELEPHONE ENCOUNTER
----- Message from Prachi Meredith sent at 8/13/2019  9:48 AM CDT -----  Contact: Michelle/Pen[facility] [phone]   States that she is calling to see if pt will be continuing the medication Victoza 18mg pen injectors. Please call back at [phone] ref# [id number]//thank you acc

## 2019-09-03 ENCOUNTER — TELEPHONE (OUTPATIENT)
Dept: INTERNAL MEDICINE | Facility: CLINIC | Age: 59
End: 2019-09-03

## 2019-09-03 NOTE — TELEPHONE ENCOUNTER
I tried submitting pa on patient and it seems as in if patient may possibly have a new ins plan, I tried reaching patient to confirm and there was no answer and vmb was full.

## 2019-09-03 NOTE — TELEPHONE ENCOUNTER
----- Message from Tonie Jessika sent at 9/3/2019 12:18 PM CDT -----  Contact: Penelope Vasquez  Meds, [phone]  Calling in regards to prior authorization status for Victoza 18 mg. Reference #[id number].

## 2019-10-15 ENCOUNTER — PATIENT OUTREACH (OUTPATIENT)
Dept: ADMINISTRATIVE | Facility: HOSPITAL | Age: 59
End: 2019-10-15

## 2019-10-16 ENCOUNTER — OFFICE VISIT (OUTPATIENT)
Dept: INTERNAL MEDICINE | Facility: CLINIC | Age: 59
End: 2019-10-16
Payer: COMMERCIAL

## 2019-10-16 ENCOUNTER — OFFICE VISIT (OUTPATIENT)
Dept: OTOLARYNGOLOGY | Facility: CLINIC | Age: 59
End: 2019-10-16
Payer: COMMERCIAL

## 2019-10-16 ENCOUNTER — LAB VISIT (OUTPATIENT)
Dept: LAB | Facility: HOSPITAL | Age: 59
End: 2019-10-16
Attending: FAMILY MEDICINE
Payer: COMMERCIAL

## 2019-10-16 VITALS
HEIGHT: 71 IN | WEIGHT: 227.31 LBS | SYSTOLIC BLOOD PRESSURE: 139 MMHG | BODY MASS INDEX: 31.82 KG/M2 | DIASTOLIC BLOOD PRESSURE: 86 MMHG | HEART RATE: 84 BPM

## 2019-10-16 VITALS
WEIGHT: 224.44 LBS | HEIGHT: 71 IN | DIASTOLIC BLOOD PRESSURE: 90 MMHG | TEMPERATURE: 98 F | HEART RATE: 72 BPM | BODY MASS INDEX: 31.42 KG/M2 | SYSTOLIC BLOOD PRESSURE: 150 MMHG

## 2019-10-16 DIAGNOSIS — H65.93 BILATERAL NON-SUPPURATIVE OTITIS MEDIA: ICD-10-CM

## 2019-10-16 DIAGNOSIS — I10 ESSENTIAL HYPERTENSION: ICD-10-CM

## 2019-10-16 DIAGNOSIS — E11.620 TYPE 2 DIABETES MELLITUS WITH DIABETIC DERMATITIS, WITH LONG-TERM CURRENT USE OF INSULIN: ICD-10-CM

## 2019-10-16 DIAGNOSIS — E78.5 HYPERLIPIDEMIA ASSOCIATED WITH TYPE 2 DIABETES MELLITUS: ICD-10-CM

## 2019-10-16 DIAGNOSIS — Z79.4 TYPE 2 DIABETES MELLITUS WITHOUT COMPLICATION, WITH LONG-TERM CURRENT USE OF INSULIN: ICD-10-CM

## 2019-10-16 DIAGNOSIS — H92.11 OTORRHEA, RIGHT EAR: Primary | ICD-10-CM

## 2019-10-16 DIAGNOSIS — E11.69 HYPERLIPIDEMIA ASSOCIATED WITH TYPE 2 DIABETES MELLITUS: ICD-10-CM

## 2019-10-16 DIAGNOSIS — H93.13 TINNITUS OF BOTH EARS: ICD-10-CM

## 2019-10-16 DIAGNOSIS — H72.93 PERFORATION OF BOTH TYMPANIC MEMBRANES: ICD-10-CM

## 2019-10-16 DIAGNOSIS — Z72.0 TOBACCO ABUSE: ICD-10-CM

## 2019-10-16 DIAGNOSIS — Z79.4 TYPE 2 DIABETES MELLITUS WITH DIABETIC DERMATITIS, WITH LONG-TERM CURRENT USE OF INSULIN: ICD-10-CM

## 2019-10-16 DIAGNOSIS — E11.9 TYPE 2 DIABETES MELLITUS WITHOUT COMPLICATION, WITH LONG-TERM CURRENT USE OF INSULIN: ICD-10-CM

## 2019-10-16 DIAGNOSIS — M47.817 FACET ARTHRITIS, DEGENERATIVE, L5-S1 LEVEL, LUMBOSACRAL SPINE: ICD-10-CM

## 2019-10-16 DIAGNOSIS — Z00.00 ROUTINE GENERAL MEDICAL EXAMINATION AT A HEALTH CARE FACILITY: ICD-10-CM

## 2019-10-16 DIAGNOSIS — H91.90 HEARING LOSS, UNSPECIFIED HEARING LOSS TYPE, UNSPECIFIED LATERALITY: Primary | ICD-10-CM

## 2019-10-16 DIAGNOSIS — H91.90 PERCEIVED HEARING CHANGES: ICD-10-CM

## 2019-10-16 DIAGNOSIS — Z23 NEED FOR PNEUMOCOCCAL VACCINATION: ICD-10-CM

## 2019-10-16 DIAGNOSIS — Z12.11 COLON CANCER SCREENING: ICD-10-CM

## 2019-10-16 LAB
ALBUMIN SERPL BCP-MCNC: 4 G/DL (ref 3.5–5.2)
ALBUMIN/CREAT UR: 7.6 UG/MG (ref 0–30)
ALP SERPL-CCNC: 66 U/L (ref 55–135)
ALT SERPL W/O P-5'-P-CCNC: 24 U/L (ref 10–44)
ANION GAP SERPL CALC-SCNC: 6 MMOL/L (ref 8–16)
AST SERPL-CCNC: 24 U/L (ref 10–40)
BASOPHILS # BLD AUTO: 0.02 K/UL (ref 0–0.2)
BASOPHILS NFR BLD: 0.5 % (ref 0–1.9)
BILIRUB SERPL-MCNC: 0.3 MG/DL (ref 0.1–1)
BUN SERPL-MCNC: 10 MG/DL (ref 6–20)
CALCIUM SERPL-MCNC: 9.1 MG/DL (ref 8.7–10.5)
CHLORIDE SERPL-SCNC: 106 MMOL/L (ref 95–110)
CHOLEST SERPL-MCNC: 169 MG/DL (ref 120–199)
CHOLEST/HDLC SERPL: 4.2 {RATIO} (ref 2–5)
CO2 SERPL-SCNC: 29 MMOL/L (ref 23–29)
CREAT SERPL-MCNC: 1 MG/DL (ref 0.5–1.4)
CREAT UR-MCNC: 210 MG/DL (ref 23–375)
DIFFERENTIAL METHOD: ABNORMAL
EOSINOPHIL # BLD AUTO: 0.1 K/UL (ref 0–0.5)
EOSINOPHIL NFR BLD: 3.3 % (ref 0–8)
ERYTHROCYTE [DISTWIDTH] IN BLOOD BY AUTOMATED COUNT: 15.9 % (ref 11.5–14.5)
EST. GFR  (AFRICAN AMERICAN): >60 ML/MIN/1.73 M^2
EST. GFR  (NON AFRICAN AMERICAN): >60 ML/MIN/1.73 M^2
GLUCOSE SERPL-MCNC: 99 MG/DL (ref 70–110)
HCT VFR BLD AUTO: 42.7 % (ref 40–54)
HDLC SERPL-MCNC: 40 MG/DL (ref 40–75)
HDLC SERPL: 23.7 % (ref 20–50)
HGB BLD-MCNC: 13.2 G/DL (ref 14–18)
IMM GRANULOCYTES # BLD AUTO: 0.01 K/UL (ref 0–0.04)
IMM GRANULOCYTES NFR BLD AUTO: 0.3 % (ref 0–0.5)
LDLC SERPL CALC-MCNC: 104.4 MG/DL (ref 63–159)
LYMPHOCYTES # BLD AUTO: 2 K/UL (ref 1–4.8)
LYMPHOCYTES NFR BLD: 50.5 % (ref 18–48)
MCH RBC QN AUTO: 27.6 PG (ref 27–31)
MCHC RBC AUTO-ENTMCNC: 30.9 G/DL (ref 32–36)
MCV RBC AUTO: 89 FL (ref 82–98)
MICROALBUMIN UR DL<=1MG/L-MCNC: 16 UG/ML
MONOCYTES # BLD AUTO: 0.4 K/UL (ref 0.3–1)
MONOCYTES NFR BLD: 10.1 % (ref 4–15)
NEUTROPHILS # BLD AUTO: 1.4 K/UL (ref 1.8–7.7)
NEUTROPHILS NFR BLD: 35.3 % (ref 38–73)
NONHDLC SERPL-MCNC: 129 MG/DL
NRBC BLD-RTO: 0 /100 WBC
PLATELET # BLD AUTO: 307 K/UL (ref 150–350)
PMV BLD AUTO: 9.7 FL (ref 9.2–12.9)
POTASSIUM SERPL-SCNC: 4.6 MMOL/L (ref 3.5–5.1)
PROT SERPL-MCNC: 7.5 G/DL (ref 6–8.4)
RBC # BLD AUTO: 4.78 M/UL (ref 4.6–6.2)
SODIUM SERPL-SCNC: 141 MMOL/L (ref 136–145)
T4 FREE SERPL-MCNC: 0.78 NG/DL (ref 0.71–1.51)
TRIGL SERPL-MCNC: 123 MG/DL (ref 30–150)
TSH SERPL DL<=0.005 MIU/L-ACNC: 1.09 UIU/ML (ref 0.4–4)
WBC # BLD AUTO: 3.96 K/UL (ref 3.9–12.7)

## 2019-10-16 PROCEDURE — 3008F PR BODY MASS INDEX (BMI) DOCUMENTED: ICD-10-PCS | Mod: CPTII,S$GLB,, | Performed by: FAMILY MEDICINE

## 2019-10-16 PROCEDURE — 3077F SYST BP >= 140 MM HG: CPT | Mod: CPTII,S$GLB,, | Performed by: FAMILY MEDICINE

## 2019-10-16 PROCEDURE — 3077F PR MOST RECENT SYSTOLIC BLOOD PRESSURE >= 140 MM HG: ICD-10-PCS | Mod: CPTII,S$GLB,, | Performed by: FAMILY MEDICINE

## 2019-10-16 PROCEDURE — 84443 ASSAY THYROID STIM HORMONE: CPT

## 2019-10-16 PROCEDURE — 90472 IMMUNIZATION ADMIN EACH ADD: CPT | Mod: S$GLB,,, | Performed by: FAMILY MEDICINE

## 2019-10-16 PROCEDURE — 83036 HEMOGLOBIN GLYCOSYLATED A1C: CPT

## 2019-10-16 PROCEDURE — 3044F PR MOST RECENT HEMOGLOBIN A1C LEVEL <7.0%: ICD-10-PCS | Mod: CPTII,S$GLB,, | Performed by: FAMILY MEDICINE

## 2019-10-16 PROCEDURE — 99999 PR PBB SHADOW E&M-EST. PATIENT-LVL V: CPT | Mod: PBBFAC,,, | Performed by: PHYSICIAN ASSISTANT

## 2019-10-16 PROCEDURE — 90670 PCV13 VACCINE IM: CPT | Mod: S$GLB,,, | Performed by: FAMILY MEDICINE

## 2019-10-16 PROCEDURE — 82043 UR ALBUMIN QUANTITATIVE: CPT

## 2019-10-16 PROCEDURE — 85025 COMPLETE CBC W/AUTO DIFF WBC: CPT

## 2019-10-16 PROCEDURE — 99999 PR PBB SHADOW E&M-EST. PATIENT-LVL IV: CPT | Mod: PBBFAC,,, | Performed by: FAMILY MEDICINE

## 2019-10-16 PROCEDURE — 84439 ASSAY OF FREE THYROXINE: CPT

## 2019-10-16 PROCEDURE — 36415 COLL VENOUS BLD VENIPUNCTURE: CPT | Mod: PO

## 2019-10-16 PROCEDURE — 99214 OFFICE O/P EST MOD 30 MIN: CPT | Mod: 25,S$GLB,, | Performed by: FAMILY MEDICINE

## 2019-10-16 PROCEDURE — 99999 PR PBB SHADOW E&M-EST. PATIENT-LVL V: ICD-10-PCS | Mod: PBBFAC,,, | Performed by: PHYSICIAN ASSISTANT

## 2019-10-16 PROCEDURE — 90471 FLU VACCINE (QUAD) GREATER THAN OR EQUAL TO 3YO PRESERVATIVE FREE IM: ICD-10-PCS | Mod: S$GLB,,, | Performed by: FAMILY MEDICINE

## 2019-10-16 PROCEDURE — 90686 FLU VACCINE (QUAD) GREATER THAN OR EQUAL TO 3YO PRESERVATIVE FREE IM: ICD-10-PCS | Mod: S$GLB,,, | Performed by: FAMILY MEDICINE

## 2019-10-16 PROCEDURE — 3044F HG A1C LEVEL LT 7.0%: CPT | Mod: CPTII,S$GLB,, | Performed by: FAMILY MEDICINE

## 2019-10-16 PROCEDURE — 99244 PR OFFICE CONSULTATION,LEVEL IV: ICD-10-PCS | Mod: S$GLB,,, | Performed by: PHYSICIAN ASSISTANT

## 2019-10-16 PROCEDURE — 90471 IMMUNIZATION ADMIN: CPT | Mod: S$GLB,,, | Performed by: FAMILY MEDICINE

## 2019-10-16 PROCEDURE — 99999 PR PBB SHADOW E&M-EST. PATIENT-LVL IV: ICD-10-PCS | Mod: PBBFAC,,, | Performed by: FAMILY MEDICINE

## 2019-10-16 PROCEDURE — 3008F BODY MASS INDEX DOCD: CPT | Mod: CPTII,S$GLB,, | Performed by: FAMILY MEDICINE

## 2019-10-16 PROCEDURE — 3080F PR MOST RECENT DIASTOLIC BLOOD PRESSURE >= 90 MM HG: ICD-10-PCS | Mod: CPTII,S$GLB,, | Performed by: FAMILY MEDICINE

## 2019-10-16 PROCEDURE — 90686 IIV4 VACC NO PRSV 0.5 ML IM: CPT | Mod: S$GLB,,, | Performed by: FAMILY MEDICINE

## 2019-10-16 PROCEDURE — 3080F DIAST BP >= 90 MM HG: CPT | Mod: CPTII,S$GLB,, | Performed by: FAMILY MEDICINE

## 2019-10-16 PROCEDURE — 99214 PR OFFICE/OUTPT VISIT, EST, LEVL IV, 30-39 MIN: ICD-10-PCS | Mod: 25,S$GLB,, | Performed by: FAMILY MEDICINE

## 2019-10-16 PROCEDURE — 80061 LIPID PANEL: CPT

## 2019-10-16 PROCEDURE — 99396 PR PREVENTIVE VISIT,EST,40-64: ICD-10-PCS | Mod: 25,S$GLB,, | Performed by: FAMILY MEDICINE

## 2019-10-16 PROCEDURE — 99396 PREV VISIT EST AGE 40-64: CPT | Mod: 25,S$GLB,, | Performed by: FAMILY MEDICINE

## 2019-10-16 PROCEDURE — 99244 OFF/OP CNSLTJ NEW/EST MOD 40: CPT | Mod: S$GLB,,, | Performed by: PHYSICIAN ASSISTANT

## 2019-10-16 PROCEDURE — 90670 PNEUMOCOCCAL CONJUGATE VACCINE 13-VALENT LESS THAN 5YO & GREATER THAN: ICD-10-PCS | Mod: S$GLB,,, | Performed by: FAMILY MEDICINE

## 2019-10-16 PROCEDURE — 80053 COMPREHEN METABOLIC PANEL: CPT

## 2019-10-16 PROCEDURE — 90472 PNEUMOCOCCAL CONJUGATE VACCINE 13-VALENT LESS THAN 5YO & GREATER THAN: ICD-10-PCS | Mod: S$GLB,,, | Performed by: FAMILY MEDICINE

## 2019-10-16 RX ORDER — PEN NEEDLE, DIABETIC 30 GX3/16"
NEEDLE, DISPOSABLE MISCELLANEOUS
Qty: 100 EACH | Refills: 10 | Status: SHIPPED | OUTPATIENT
Start: 2019-10-16 | End: 2021-01-05 | Stop reason: SDUPTHER

## 2019-10-16 RX ORDER — AMOXICILLIN 875 MG/1
875 TABLET, FILM COATED ORAL 2 TIMES DAILY
Qty: 20 TABLET | Refills: 0 | Status: SHIPPED | OUTPATIENT
Start: 2019-10-16 | End: 2020-07-09 | Stop reason: SDUPTHER

## 2019-10-16 RX ORDER — INSULIN GLARGINE 100 [IU]/ML
INJECTION, SOLUTION SUBCUTANEOUS
Qty: 45 ML | Refills: 3 | Status: SHIPPED | OUTPATIENT
Start: 2019-10-16 | End: 2020-01-24 | Stop reason: SDUPTHER

## 2019-10-16 RX ORDER — ATORVASTATIN CALCIUM 40 MG/1
40 TABLET, FILM COATED ORAL DAILY
Qty: 90 TABLET | Refills: 3 | Status: SHIPPED | OUTPATIENT
Start: 2019-10-16 | End: 2020-07-09 | Stop reason: SDUPTHER

## 2019-10-16 RX ORDER — OFLOXACIN 3 MG/ML
3 SOLUTION AURICULAR (OTIC) 2 TIMES DAILY
Qty: 5 ML | Refills: 0 | Status: SHIPPED | OUTPATIENT
Start: 2019-10-16 | End: 2019-10-26

## 2019-10-16 RX ORDER — LOSARTAN POTASSIUM 100 MG/1
100 TABLET ORAL DAILY
Qty: 90 TABLET | Refills: 3 | Status: SHIPPED | OUTPATIENT
Start: 2019-10-16 | End: 2020-07-09 | Stop reason: SDUPTHER

## 2019-10-16 RX ORDER — FLUTICASONE PROPIONATE 50 MCG
SPRAY, SUSPENSION (ML) NASAL
Qty: 16 ML | Refills: 11 | Status: SHIPPED | OUTPATIENT
Start: 2019-10-16

## 2019-10-16 NOTE — PROGRESS NOTES
Subjective:      Patient ID: Epifanio Godoy is a 59 y.o. male.    Chief Complaint:  Hearing loss, diabetes hypertension    Disclaimer:  This note is prepared using voice recognition software and as such is likely to have errors and has not been proof read. Please contact me for questions.     Patient is here not only for his prevention wellness exam but also because he is needing some issues addressed.  Blood pressure is elevated today.  Is currently on losartan.  Is not enroll in digital hypertension program.  Recently has been eating worsens getting back in to town.  Willing to do lab work today.    He reports his diabetes is well controlled currently only doing Lantus 10 in Victoza 1.8.  Reports that most sugars are 103-118.  However last week he has eaten worse lately.    Also reports some hearing loss.  And recurrent ear infections.  Has been on antibiotics and topical drops.  Has not been helping.  Willing to see ENT as well as audiology.    Also occasionally reports some discomfort with sciatica issues.  Has been trying to do some stretching.  Has known issues with arthritis.      Lab Results   Component Value Date    WBC 5.13 07/31/2018    HGB 12.2 (L) 07/31/2018    HCT 40.8 07/31/2018     (H) 07/31/2018    CHOL 148 07/31/2018    TRIG 111 07/31/2018    HDL 41 07/31/2018    ALT 33 07/31/2018    AST 32 07/31/2018     07/31/2018    K 4.1 07/31/2018     07/31/2018    CREATININE 0.8 07/31/2018    BUN 14 07/31/2018    CO2 26 07/31/2018    TSH 1.294 10/31/2016    PSA 0.23 07/31/2018    HGBA1C 6.1 (H) 07/19/2019             Review of Systems   HENT: Positive for congestion, ear pain and hearing loss.    Eyes: Negative for visual disturbance.   Respiratory: Negative for chest tightness.    Cardiovascular: Negative for leg swelling.   Musculoskeletal: Positive for arthralgias, back pain and myalgias.     Objective:     Vitals:    10/16/19 0908   BP: (!) 150/90   Pulse: 72   Temp: 98 °F (36.7 °C)  "  Weight: 101.8 kg (224 lb 6.9 oz)   Height: 5' 11" (1.803 m)     Physical Exam   Constitutional:   AAM   HENT:   Right Ear: External ear normal. Tympanic membrane is injected, perforated and erythematous. Decreased hearing is noted.   Left Ear: External ear normal. There is drainage. Tympanic membrane is injected and erythematous. Tympanic membrane is not perforated. Decreased hearing is noted.   Nose: Nose normal.   Mouth/Throat: Oropharynx is clear and moist. No oropharyngeal exudate.   Eyes: Pupils are equal, round, and reactive to light. EOM are normal.   Neck: Carotid bruit is not present.   Cardiovascular:   No murmur heard.  Abdominal: Soft. Bowel sounds are normal. He exhibits no distension. There is no tenderness. There is no guarding.   Musculoskeletal:        Lumbar back: He exhibits normal range of motion, no tenderness, no bony tenderness, no pain and no spasm.   Vitals reviewed.    Assessment:     1. Hearing loss, unspecified hearing loss type, unspecified laterality    2. Bilateral non-suppurative otitis media    3. Type 2 diabetes mellitus with diabetic dermatitis, with long-term current use of insulin    4. Essential hypertension    5. Hyperlipidemia associated with type 2 diabetes mellitus    6. Facet arthritis, degenerative, L5-S1 level, lumbosacral spine    7. Type 2 diabetes mellitus without complication, with long-term current use of insulin    8. Tobacco abuse                  Plan:   Epifanio was seen today for hearing loss, ear issues, blood pressure diabetes cholesterol.    Diagnoses and all orders for this visit:    Hearing loss, unspecified hearing loss type, unspecified laterality-will go ahead refer to ENT and audiology as completed antibiotics and ear drops however with continued issues  -     Ambulatory referral to ENT  -     Ambulatory Referral to Audiology    Bilateral non-suppurative otitis media not improved after antibiotics and ear drops refer to ENT and audiology  -     Ambulatory " referral to ENT  -     Ambulatory Referral to Audiology    Type 2 diabetes mellitus with diabetic dermatitis, with long-term current use of insulin-checking labs today adjust medications based on numbers.  Refer to Optometry.  -     TSH; Future  -     T4, free; Future  -     Lipid panel; Future  -     Microalbumin/creatinine urine ratio  -     Hemoglobin A1c; Future  -     Comprehensive metabolic panel; Future  -     CBC auto differential; Future  -     Ambulatory referral to Optometry  -     Diabetes Digital Medicine (DDMP) Enrollment Order  -     Diabetes Digital Medicine (DDMP): Assign Onboarding Questionnaires  -     Hypertension Digital Medicine (HDMP) Enrollment Order  -     Hypertension Digital Medicine (HDMP): Assign Onboarding Questionnaires    Essential hypertension-not controlled enroll in digital blood pressure program.  Restart losartan.    -     TSH; Future  -     T4, free; Future  -     Lipid panel; Future  -     Microalbumin/creatinine urine ratio  -     Hemoglobin A1c; Future  -     Comprehensive metabolic panel; Future  -     CBC auto differential; Future  -     Diabetes Digital Medicine (DDMP) Enrollment Order  -     Diabetes Digital Medicine (DDMP): Assign Onboarding Questionnaires  -     Hypertension Digital Medicine (HDMP) Enrollment Order  -     Hypertension Digital Medicine (HDMP): Assign Onboarding Questionnaires    Hyperlipidemia associated with type 2 diabetes mellitus-had stopped medication restart lab work today.  -     atorvastatin (LIPITOR) 40 MG tablet; Take 1 tablet (40 mg total) by mouth once daily.  -     TSH; Future  -     T4, free; Future  -     Lipid panel; Future  -     Microalbumin/creatinine urine ratio  -     Hemoglobin A1c; Future  -     Comprehensive metabolic panel; Future  -     CBC auto differential; Future    Facet arthritis, degenerative, L5-S1 level, lumbosacral spine-continue with stretching exercises work on weight loss  -     TSH; Future  -     T4, free; Future  -  "    Lipid panel; Future  -     Microalbumin/creatinine urine ratio  -     Hemoglobin A1c; Future  -     Comprehensive metabolic panel; Future  -     CBC auto differential; Future    Type 2 diabetes mellitus without complication, with long-term current use of insulin  -     insulin (LANTUS SOLOSTAR U-100 INSULIN) glargine 100 units/mL (3mL) SubQ pen; INJECT 10-20 UNITS INTO THE SKIN ONCE DAILY.  -     liraglutide 0.6 mg/0.1 mL, 18 mg/3 mL, subq PNIJ (VICTOZA 3-JULIETTE) 0.6 mg/0.1 mL (18 mg/3 mL) PnIj; Inject 1.8 mg into the skin once daily.  -     losartan (COZAAR) 100 MG tablet; Take 1 tablet (100 mg total) by mouth once daily. 1 Tablet Oral Every day  -     pen needle, diabetic (BD ULTRA-FINE MINI PEN NEEDLE) 31 gauge x 3/16" Ndle; USE 3 TIMES PER DAY  -     TSH; Future  -     T4, free; Future  -     Lipid panel; Future  -     Microalbumin/creatinine urine ratio  -     Hemoglobin A1c; Future  -     Comprehensive metabolic panel; Future  -     CBC auto differential; Future    Tobacco abuse encourage still to continue to quit  -     TSH; Future  -     T4, free; Future  -     Lipid panel; Future  -     Microalbumin/creatinine urine ratio  -     Hemoglobin A1c; Future  -     Comprehensive metabolic panel; Future  -     CBC auto differential; Future      Other orders  -     fluticasone propionate (FLONASE) 50 mcg/actuation nasal spray; SHAKE LIQUID AND USE 2 SPRAYS(100 MCG) IN EACH NOSTRIL EVERY DAY  -     Influenza - Quadrivalent (PF)            Follow up in about 4 weeks (around 11/13/2019) for telemed visit , chronic issues Dr Shepherd.    There are no Patient Instructions on file for this visit.                  "

## 2019-10-16 NOTE — PROGRESS NOTES
Subjective:      Patient ID: Epifanio Godoy is a 59 y.o. male.    Chief Complaint: Annual Exam    Disclaimer:  This note is prepared using voice recognition software and as such is likely to have errors and has not been proof read. Please contact me for questions.     Epifanio Godoy is a 59 y.o. male who presents today for a wellness exam.  Needing to update labs.  Needing to update Tdap.  Needing to update foot exam.  Needing to update eye exam.  Doing well otherwise.  Has diabetes hyperlipidemia hypertension and BMI of 31.  Former smoker.  Was out of town for quite some time but now back in town.    Wt Readings from Last 10 Encounters:  10/16/19 : 101.8 kg (224 lb 6.9 oz)  04/18/19 : 99.7 kg (219 lb 12.8 oz)  07/31/18 : 102.5 kg (225 lb 15.5 oz)  01/02/18 : 103.1 kg (227 lb 4.7 oz)  08/29/17 : 103 kg (227 lb)  04/10/17 : 100.6 kg (221 lb 12.5 oz)  01/11/17 : 100.5 kg (221 lb 9 oz)  01/03/17 : 100.7 kg (222 lb)  11/04/16 : 102.7 kg (226 lb 6.6 oz)  10/03/16 : 103.3 kg (227 lb 11.8 oz)            Lab Results   Component Value Date    WBC 5.13 07/31/2018    HGB 12.2 (L) 07/31/2018    HCT 40.8 07/31/2018     (H) 07/31/2018    CHOL 148 07/31/2018    TRIG 111 07/31/2018    HDL 41 07/31/2018    ALT 33 07/31/2018    AST 32 07/31/2018     07/31/2018    K 4.1 07/31/2018     07/31/2018    CREATININE 0.8 07/31/2018    BUN 14 07/31/2018    CO2 26 07/31/2018    TSH 1.294 10/31/2016    PSA 0.23 07/31/2018    HGBA1C 6.1 (H) 07/19/2019             Review of Systems   Constitutional: Positive for activity change, appetite change and unexpected weight change.   Respiratory: Negative for cough and shortness of breath.    Cardiovascular: Negative for chest pain and palpitations.   Psychiatric/Behavioral: Negative for decreased concentration, dysphoric mood and sleep disturbance.     Objective:     Vitals:    10/16/19 0908   BP: (!) 150/90   Pulse: 72   Temp: 98 °F (36.7 °C)   Weight: 101.8 kg (224 lb 6.9 oz)  "  Height: 5' 11" (1.803 m)     Physical Exam   Constitutional: He appears well-developed and well-nourished.    male obese   HENT:   Head: Normocephalic and atraumatic.   Eyes: Conjunctivae are normal.   Cardiovascular: Normal rate, regular rhythm and normal heart sounds.   Pulses:       Dorsalis pedis pulses are 2+ on the right side, and 2+ on the left side.   Pulmonary/Chest: Effort normal and breath sounds normal.   Abdominal: Soft. Bowel sounds are normal.   Musculoskeletal:        Right foot: There is normal range of motion and no deformity.        Left foot: There is normal range of motion and no deformity.   Feet:   Right Foot:   Protective Sensation: 4 sites tested. 4 sites sensed.   Skin Integrity: Positive for warmth. Negative for ulcer, blister, skin breakdown, erythema, callus or dry skin.   Left Foot:   Protective Sensation: 4 sites tested. 4 sites sensed.   Skin Integrity: Positive for warmth. Negative for ulcer, blister, skin breakdown, erythema, callus or dry skin.   Vitals reviewed.    Assessment:     1. Routine general medical examination at a health care facility            4. Tobacco abuse                8. Colon cancer screening            11. Need for pneumococcal vaccination      Plan:   Epifanio was seen today for annual exam.    Diagnoses and all orders for this visit:    Routine general medical examination at a health care facility-labs updated today discussed health maintenance update eye exam foot exam update immunizations.  Update colonoscopy.  -     TSH; Future  -     T4, free; Future  -     Lipid panel; Future  -     Microalbumin/creatinine urine ratio  -     Hemoglobin A1c; Future  -     Comprehensive metabolic panel; Future  -     CBC auto differential; Future      Tobacco abuse discuss cessation  -     TSH; Future  -     T4, free; Future  -     Lipid panel; Future  -     Microalbumin/creatinine urine ratio  -     Hemoglobin A1c; Future  -     Comprehensive metabolic " panel; Future  -     CBC auto differential; Future    Colonoscopy ordered.  Need for pneumococcal vaccination  -     (In Office Administered) Pneumococcal Conjugate Vaccine (13 Valent) (IM)    Other orders  -     fluticasone propionate (FLONASE) 50 mcg/actuation nasal spray; SHAKE LIQUID AND USE 2 SPRAYS(100 MCG) IN EACH NOSTRIL EVERY DAY  -     Influenza - Quadrivalent (PF)            Follow up in about 4 weeks (around 11/13/2019) for telemed visit , chronic issues Dr Shepherd.    There are no Patient Instructions on file for this visit.

## 2019-10-16 NOTE — PROGRESS NOTES
Subjective:       Patient ID: Epifanio Godoy is a 59 y.o. male.    Chief Complaint: Hearing Loss    Patient is a very pleasant 59 y.o. male here to see me today for the first time in consultation at the request of Dr. Shepherd for evaluation of hearing loss and chronic ear drainage AU.  He reports issues with bilateral ear drainage for past 1 year.   He says he does not tolerate water in his ears over past year because they'll drain for days.  At times the drainage is yellow or bloody.  Denies significant otalgia.  He reports hearing loss that has been gradually progressing over the last one year.  He has noted a difference in hearing between the ears, with the right ear being the better hearing ear.  He has noted occasional tinnitus in both ears.   He denies a family history of hearing loss, and has not had any previous otologic surgery.  He has a history of significant loud noise exposure (). He denies issues with dizziness.  He has been treated with oral antibiotics for his ears (months ago) and has tried OTC ear drops with no relief.  No fever.  He does smoke; less than half a pack per day x 10 years.  He has Flonase but does not use it daily.      Review of Systems   Constitutional: Negative for activity change, appetite change and fever.   HENT: Positive for ear discharge, hearing loss and tinnitus. Negative for congestion, ear pain, nosebleeds, postnasal drip, rhinorrhea, sinus pressure, sinus pain and sore throat.    Eyes: Negative for discharge.   Respiratory: Negative for cough and shortness of breath.    Cardiovascular: Negative for chest pain and palpitations.   Gastrointestinal: Negative for diarrhea, nausea and vomiting.   Musculoskeletal: Negative for gait problem.   Allergic/Immunologic: Negative for food allergies.   Neurological: Negative for dizziness, light-headedness and headaches.   Hematological: Negative for adenopathy.   Psychiatric/Behavioral: Negative for confusion.        Objective:      Physical Exam   Constitutional: He is oriented to person, place, and time. Vital signs are normal. He appears well-developed and well-nourished. No distress.   HENT:   Head: Normocephalic and atraumatic.   Right Ear: External ear and ear canal normal. There is drainage (purulent). No mastoid tenderness. Tympanic membrane is perforated, erythematous and retracted.   Left Ear: External ear and ear canal normal. No drainage. No mastoid tenderness. Tympanic membrane is perforated (severe retraction versus inferior perforation, dry), erythematous and retracted.  No middle ear effusion.   Nose: No mucosal edema, rhinorrhea, nasal deformity or septal deviation. Right sinus exhibits no maxillary sinus tenderness and no frontal sinus tenderness. Left sinus exhibits no maxillary sinus tenderness and no frontal sinus tenderness.   Mouth/Throat: Uvula is midline, oropharynx is clear and moist and mucous membranes are normal. No trismus in the jaw. Normal dentition. No uvula swelling. No oropharyngeal exudate or posterior oropharyngeal edema.   Eyes: Pupils are equal, round, and reactive to light. Conjunctivae and EOM are normal. Right eye exhibits no chemosis. Left eye exhibits no chemosis. Right conjunctiva is not injected. Left conjunctiva is not injected. No scleral icterus.   Neck: Trachea normal and phonation normal. No tracheal tenderness present. No tracheal deviation present. No thyroid mass and no thyromegaly present.   Cardiovascular: Intact distal pulses.   Pulmonary/Chest: Effort normal. No accessory muscle usage or stridor. No respiratory distress.   Lymphadenopathy:        Head (right side): No submental, no submandibular, no preauricular and no posterior auricular adenopathy present.        Head (left side): No submental, no submandibular, no preauricular and no posterior auricular adenopathy present.     He has no cervical adenopathy.        Right cervical: No superficial cervical and no deep  cervical adenopathy present.       Left cervical: No superficial cervical and no deep cervical adenopathy present.   Neurological: He is alert and oriented to person, place, and time. No cranial nerve deficit.   Skin: Skin is warm and dry. No rash noted. No erythema.   Psychiatric: He has a normal mood and affect. His behavior is normal. Thought content normal.         Tuning fork exam:  Caal does not lateralize  Rinne  BC > AC  AU        Assessment:       1. Otorrhea, right ear    2. Perforation of both tympanic membranes    3. Perceived hearing changes    4. Tinnitus of both ears        Plan:         The patient has drainage from the the right ear today.  I would recommend that we start the patient on Floxin ear drops as well as course of oral Amoxil x 10 days.  Recommend dry ear precautions.  Plan for RTC in 2 weeks with audiogram and then see Dr. Gee for further evaluation.  Patient is unaware of any ear trauma or TM perforations in past.    Plan for audiogram at his return visit.  We also discussed that tinnitus is most often caused by a hearing loss, and that as the hair cells are damaged, either genetic or as a result of loud noise exposure, they then cause tinnitus.  Some patients find that restricting the salt or caffeine in their diet helps, and there is also an OTC supplement, lipoflavinoids, that some people find to be effective though their benefit is not fully proven.  Tinnitus tends to be louder in times of stress and fatigue, and may decrease with time.  Sound machines may also be an effective masking technique if needed at night.    Thanks for the referral Dr. Shepherd.  Report returned via Epic.

## 2019-10-16 NOTE — LETTER
October 16, 2019      Holly Shepherd MD  [address]  [address]  [place] [address]           [facility]  [address]  [place] [address]  Phone: [phone]  Fax: [phone]          Patient: Epifanio Godoy   MR Number: [id number]   YOB: 1960   Date of Visit: 10/16/2019       Dear Dr. Holly Shepherd:    Thank you for referring Epifanio Godoy to me for evaluation. Attached you will find relevant portions of my assessment and plan of care.    If you have questions, please do not hesitate to call me. I look forward to following Epifanio Godoy along with you.    Sincerely,    Trang Wyman PA-C    Enclosure  CC:  No Recipients    If you would like to receive this communication electronically, please contact [email or web address] or [phone] to request more information on [employer] access.    For providers and/or their staff who would like to refer a patient to [facility], please contact us through our one-stop-shop provider referral line, [facility], at [phone].    If you feel you have received this communication in error or would no longer like to receive these types of communications, please e-mail [email or web address]

## 2019-10-17 ENCOUNTER — TELEPHONE (OUTPATIENT)
Dept: ENDOSCOPY | Facility: HOSPITAL | Age: 59
End: 2019-10-17

## 2019-10-17 LAB
ESTIMATED AVG GLUCOSE: 134 MG/DL (ref 68–131)
HBA1C MFR BLD HPLC: 6.3 % (ref 4–5.6)

## 2019-10-18 ENCOUNTER — TELEPHONE (OUTPATIENT)
Dept: ENDOSCOPY | Facility: HOSPITAL | Age: 59
End: 2019-10-18

## 2019-10-18 NOTE — TELEPHONE ENCOUNTER
----- Message from Nguyen Harmon sent at 10/18/2019  3:14 PM CDT -----  Contact: Patient's wife-Johnnie  Type: Needs Medical Advice    Who Called:  Patient's wife-Johnnie  Symptoms (please be specific):  na  How long has patient had these symptoms:  fartun  Pharmacy name and phone #:  [facility]  Best Call Back Number: [phone]    Additional Information: states that orders have been sent in to schedule a colonoscopy. Please call to schedule, thank you!

## 2019-10-18 NOTE — TELEPHONE ENCOUNTER
Attempted to call pt to schedule Endoscopy procedure. No answer. Pt's mailbox is full at this time. dwells

## 2019-10-24 ENCOUNTER — PATIENT OUTREACH (OUTPATIENT)
Dept: ADMINISTRATIVE | Facility: HOSPITAL | Age: 59
End: 2019-10-24

## 2019-12-16 ENCOUNTER — TELEPHONE (OUTPATIENT)
Dept: INTERNAL MEDICINE | Facility: CLINIC | Age: 59
End: 2019-12-16

## 2019-12-16 NOTE — TELEPHONE ENCOUNTER
----- Message from Jerson Kilgore sent at 12/16/2019 12:35 PM CST -----  [facility]   [phone]  Would like to follow-up with nurse regarding authorization for Victoza. Please call back at [phone].  Md Prem

## 2019-12-16 NOTE — TELEPHONE ENCOUNTER
Called and spoke with [employer] [facility]. They are sending over the information on pt needing PA. Will complete once I receive.

## 2020-01-24 ENCOUNTER — OFFICE VISIT (OUTPATIENT)
Dept: INTERNAL MEDICINE | Facility: CLINIC | Age: 60
End: 2020-01-24

## 2020-01-24 VITALS
BODY MASS INDEX: 29.35 KG/M2 | HEART RATE: 76 BPM | WEIGHT: 209.69 LBS | TEMPERATURE: 98 F | HEIGHT: 71 IN | DIASTOLIC BLOOD PRESSURE: 84 MMHG | SYSTOLIC BLOOD PRESSURE: 134 MMHG

## 2020-01-24 DIAGNOSIS — E11.9 TYPE 2 DIABETES MELLITUS WITHOUT COMPLICATION, WITH LONG-TERM CURRENT USE OF INSULIN: ICD-10-CM

## 2020-01-24 DIAGNOSIS — R73.9 HYPERGLYCEMIA: Primary | ICD-10-CM

## 2020-01-24 DIAGNOSIS — Z79.4 TYPE 2 DIABETES MELLITUS WITHOUT COMPLICATION, WITH LONG-TERM CURRENT USE OF INSULIN: ICD-10-CM

## 2020-01-24 PROCEDURE — 99213 OFFICE O/P EST LOW 20 MIN: CPT | Mod: S$PBB,,, | Performed by: PHYSICIAN ASSISTANT

## 2020-01-24 PROCEDURE — 99999 PR PBB SHADOW E&M-EST. PATIENT-LVL III: ICD-10-PCS | Mod: PBBFAC,,, | Performed by: PHYSICIAN ASSISTANT

## 2020-01-24 PROCEDURE — 99999 PR PBB SHADOW E&M-EST. PATIENT-LVL III: CPT | Mod: PBBFAC,,, | Performed by: PHYSICIAN ASSISTANT

## 2020-01-24 PROCEDURE — 99213 OFFICE O/P EST LOW 20 MIN: CPT | Mod: PBBFAC,PO | Performed by: PHYSICIAN ASSISTANT

## 2020-01-24 PROCEDURE — 99213 PR OFFICE/OUTPT VISIT, EST, LEVL III, 20-29 MIN: ICD-10-PCS | Mod: S$PBB,,, | Performed by: PHYSICIAN ASSISTANT

## 2020-01-24 RX ORDER — INSULIN GLARGINE 100 [IU]/ML
INJECTION, SOLUTION SUBCUTANEOUS
Qty: 45 ML | Refills: 3 | Status: SHIPPED | OUTPATIENT
Start: 2020-01-24 | End: 2020-07-17 | Stop reason: SDUPTHER

## 2020-01-24 NOTE — PROGRESS NOTES
"Subjective:       Patient ID: Epifanio Godoy is a 59 y.o. male.    Chief Complaint: Hyperglycemia    HPI  Patient comes in today for recent poor control of BG  He is a type II DM, on insulin and oral medications  His glucose is usually shows good control but over the last few weeks it has been high     Today, 259  Drinking water and started exercising       Health Maintenance Due   Topic Date Due    Eye Exam  01/17/2018    Colonoscopy  10/16/2018    Pneumococcal Vaccine (Highest Risk) (3 of 3 - PPSV23) 12/11/2019       Past Medical History:   Diagnosis Date    Diabetes mellitus type II     Hypertension     Kidney stones     Type II or unspecified type diabetes mellitus without mention of complication, not stated as uncontrolled        Current Outpatient Medications   Medication Sig Dispense Refill    atorvastatin (LIPITOR) 40 MG tablet Take 1 tablet (40 mg total) by mouth once daily. 90 tablet 3    fluticasone propionate (FLONASE) 50 mcg/actuation nasal spray SHAKE LIQUID AND USE 2 SPRAYS(100 MCG) IN EACH NOSTRIL EVERY DAY 16 mL 11    insulin (LANTUS SOLOSTAR U-100 INSULIN) glargine 100 units/mL (3mL) SubQ pen INJECT 10-20 UNITS INTO THE SKIN ONCE DAILY. 45 mL 3    liraglutide 0.6 mg/0.1 mL, 18 mg/3 mL, subq PNIJ (VICTOZA 3-JULIETTE) 0.6 mg/0.1 mL (18 mg/3 mL) PnIj Inject 1.8 mg into the skin once daily. 27 mL 3    losartan (COZAAR) 100 MG tablet Take 1 tablet (100 mg total) by mouth once daily. 1 Tablet Oral Every day 90 tablet 3    pen needle, diabetic (BD ULTRA-FINE MINI PEN NEEDLE) 31 gauge x 3/16" Ndle USE 3 TIMES PER  each 10     No current facility-administered medications for this visit.        Review of Systems   Constitutional: Negative for fatigue, fever and unexpected weight change.   HENT: Negative for sore throat and trouble swallowing.    Respiratory: Negative for cough and shortness of breath.    Cardiovascular: Negative for chest pain.   Gastrointestinal: Negative for abdominal pain. " "  Hematological: Negative for adenopathy. Does not bruise/bleed easily.   All other systems reviewed and are negative.      Objective:   /84   Pulse 76   Temp 97.8 °F (36.6 °C) (Oral)   Ht 5' 11" (1.803 m)   Wt 95.1 kg (209 lb 10.5 oz)   BMI 29.24 kg/m²      Physical Exam   Constitutional: He is oriented to person, place, and time. He appears well-developed and well-nourished. No distress.   HENT:   Head: Normocephalic and atraumatic.   Eyes: Pupils are equal, round, and reactive to light. EOM are normal.   Neck: Normal range of motion. Neck supple.   Cardiovascular: Normal rate and regular rhythm.   Pulmonary/Chest: Effort normal.   Abdominal: Soft.   Musculoskeletal: He exhibits no edema.   Neurological: He is alert and oriented to person, place, and time.   Skin: Capillary refill takes less than 2 seconds.   Psychiatric: He has a normal mood and affect. His behavior is normal.         Lab Results   Component Value Date    WBC 3.96 10/16/2019    HGB 13.2 (L) 10/16/2019    HCT 42.7 10/16/2019     10/16/2019    CHOL 169 10/16/2019    TRIG 123 10/16/2019    HDL 40 10/16/2019    ALT 24 10/16/2019    AST 24 10/16/2019     10/16/2019    K 4.6 10/16/2019     10/16/2019    CREATININE 1.0 10/16/2019    BUN 10 10/16/2019    CO2 29 10/16/2019    TSH 1.095 10/16/2019    PSA 0.23 07/31/2018    HGBA1C 6.3 (H) 10/16/2019       Assessment:       1. Hyperglycemia    2. Type 2 diabetes mellitus without complication, with long-term current use of insulin        Plan:   Hyperglycemia    Type 2 diabetes mellitus without complication, with long-term current use of insulin  -     insulin (LANTUS SOLOSTAR U-100 INSULIN) glargine 100 units/mL (3mL) SubQ pen; INJECT 10-20 UNITS INTO THE SKIN ONCE DAILY.  Dispense: 45 mL; Refill: 3        Continue log  Discussed diet in further detail   seding new rx for insulin to [facility] to see if it is more affordable     Follow up in 2 weeks if sugars not better " controlled

## 2020-05-28 ENCOUNTER — PATIENT MESSAGE (OUTPATIENT)
Dept: INTERNAL MEDICINE | Facility: CLINIC | Age: 60
End: 2020-05-28

## 2020-05-28 RX ORDER — OFLOXACIN 3 MG/ML
SOLUTION/ DROPS OPHTHALMIC
Qty: 10 ML | Refills: 0 | Status: SHIPPED | OUTPATIENT
Start: 2020-05-28 | End: 2022-05-16

## 2020-06-26 DIAGNOSIS — E11.9 TYPE 2 DIABETES MELLITUS WITHOUT COMPLICATION: ICD-10-CM

## 2020-07-09 ENCOUNTER — PATIENT MESSAGE (OUTPATIENT)
Dept: PRIMARY CARE CLINIC | Facility: CLINIC | Age: 60
End: 2020-07-09

## 2020-07-09 DIAGNOSIS — E11.9 TYPE 2 DIABETES MELLITUS WITHOUT COMPLICATION, WITH LONG-TERM CURRENT USE OF INSULIN: ICD-10-CM

## 2020-07-09 DIAGNOSIS — E11.69 HYPERLIPIDEMIA ASSOCIATED WITH TYPE 2 DIABETES MELLITUS: ICD-10-CM

## 2020-07-09 DIAGNOSIS — Z79.4 TYPE 2 DIABETES MELLITUS WITHOUT COMPLICATION, WITH LONG-TERM CURRENT USE OF INSULIN: ICD-10-CM

## 2020-07-09 DIAGNOSIS — E78.5 HYPERLIPIDEMIA ASSOCIATED WITH TYPE 2 DIABETES MELLITUS: ICD-10-CM

## 2020-07-09 RX ORDER — LOSARTAN POTASSIUM 100 MG/1
100 TABLET ORAL DAILY
Qty: 90 TABLET | Refills: 3 | Status: SHIPPED | OUTPATIENT
Start: 2020-07-09 | End: 2020-09-21 | Stop reason: SDUPTHER

## 2020-07-09 RX ORDER — LIRAGLUTIDE 6 MG/ML
1.8 INJECTION SUBCUTANEOUS DAILY
Qty: 27 ML | Refills: 3 | Status: SHIPPED | OUTPATIENT
Start: 2020-07-09 | End: 2020-07-17 | Stop reason: SDUPTHER

## 2020-07-09 RX ORDER — ATORVASTATIN CALCIUM 40 MG/1
40 TABLET, FILM COATED ORAL DAILY
Qty: 90 TABLET | Refills: 3 | Status: SHIPPED | OUTPATIENT
Start: 2020-07-09 | End: 2020-09-21 | Stop reason: SDUPTHER

## 2020-07-09 RX ORDER — AMOXICILLIN 875 MG/1
875 TABLET, FILM COATED ORAL 2 TIMES DAILY
Qty: 20 TABLET | Refills: 0 | Status: SHIPPED | OUTPATIENT
Start: 2020-07-09 | End: 2020-07-19

## 2020-07-14 ENCOUNTER — TELEPHONE (OUTPATIENT)
Dept: PRIMARY CARE CLINIC | Facility: CLINIC | Age: 60
End: 2020-07-14

## 2020-07-14 NOTE — TELEPHONE ENCOUNTER
Called pharmacy they are stating that victoza is not on formulary and that it is not giving them the option to do a PA.

## 2020-07-14 NOTE — TELEPHONE ENCOUNTER
----- Message from Waylon Sánchez sent at 7/14/2020 10:02 AM CDT -----  Contact: Librado Han  Is calling rg pt's Masontoza is not being covered and is calling to let the office know and can be reached at [phone]//thanks/dbw

## 2020-07-17 ENCOUNTER — OFFICE VISIT (OUTPATIENT)
Dept: PRIMARY CARE CLINIC | Facility: CLINIC | Age: 60
End: 2020-07-17
Payer: COMMERCIAL

## 2020-07-17 ENCOUNTER — LAB VISIT (OUTPATIENT)
Dept: LAB | Facility: HOSPITAL | Age: 60
End: 2020-07-17
Attending: FAMILY MEDICINE
Payer: COMMERCIAL

## 2020-07-17 VITALS
DIASTOLIC BLOOD PRESSURE: 80 MMHG | WEIGHT: 212.94 LBS | TEMPERATURE: 98 F | HEART RATE: 82 BPM | OXYGEN SATURATION: 98 % | SYSTOLIC BLOOD PRESSURE: 160 MMHG | BODY MASS INDEX: 29.7 KG/M2

## 2020-07-17 DIAGNOSIS — D12.8 TUBULAR ADENOMA POLYP OF RECTUM: ICD-10-CM

## 2020-07-17 DIAGNOSIS — Z12.11 COLON CANCER SCREENING: ICD-10-CM

## 2020-07-17 DIAGNOSIS — H93.8X2 BLOCKED EAR, LEFT: ICD-10-CM

## 2020-07-17 DIAGNOSIS — E78.5 HYPERLIPIDEMIA ASSOCIATED WITH TYPE 2 DIABETES MELLITUS: ICD-10-CM

## 2020-07-17 DIAGNOSIS — Z00.00 ROUTINE GENERAL MEDICAL EXAMINATION AT A HEALTH CARE FACILITY: ICD-10-CM

## 2020-07-17 DIAGNOSIS — E11.69 HYPERLIPIDEMIA ASSOCIATED WITH TYPE 2 DIABETES MELLITUS: ICD-10-CM

## 2020-07-17 DIAGNOSIS — Z79.4 TYPE 2 DIABETES MELLITUS WITHOUT COMPLICATION, WITH LONG-TERM CURRENT USE OF INSULIN: ICD-10-CM

## 2020-07-17 DIAGNOSIS — E11.9 TYPE 2 DIABETES MELLITUS WITHOUT COMPLICATION, WITH LONG-TERM CURRENT USE OF INSULIN: ICD-10-CM

## 2020-07-17 DIAGNOSIS — Z00.00 ROUTINE GENERAL MEDICAL EXAMINATION AT A HEALTH CARE FACILITY: Primary | ICD-10-CM

## 2020-07-17 DIAGNOSIS — Z72.0 TOBACCO ABUSE: ICD-10-CM

## 2020-07-17 DIAGNOSIS — H65.195 OTHER RECURRENT ACUTE NONSUPPURATIVE OTITIS MEDIA OF LEFT EAR: ICD-10-CM

## 2020-07-17 DIAGNOSIS — Z12.5 PROSTATE CANCER SCREENING: ICD-10-CM

## 2020-07-17 PROCEDURE — 99396 PREV VISIT EST AGE 40-64: CPT | Mod: S$GLB,,, | Performed by: FAMILY MEDICINE

## 2020-07-17 PROCEDURE — 99999 PR PBB SHADOW E&M-EST. PATIENT-LVL III: ICD-10-PCS | Mod: PBBFAC,,, | Performed by: FAMILY MEDICINE

## 2020-07-17 PROCEDURE — 99999 PR PBB SHADOW E&M-EST. PATIENT-LVL III: CPT | Mod: PBBFAC,,, | Performed by: FAMILY MEDICINE

## 2020-07-17 PROCEDURE — 99396 PR PREVENTIVE VISIT,EST,40-64: ICD-10-PCS | Mod: S$GLB,,, | Performed by: FAMILY MEDICINE

## 2020-07-17 PROCEDURE — 82043 UR ALBUMIN QUANTITATIVE: CPT

## 2020-07-17 RX ORDER — INSULIN GLARGINE 100 [IU]/ML
INJECTION, SOLUTION SUBCUTANEOUS
Qty: 45 ML | Refills: 3 | Status: SHIPPED | OUTPATIENT
Start: 2020-07-17 | End: 2021-01-04 | Stop reason: SDUPTHER

## 2020-07-17 RX ORDER — LIRAGLUTIDE 6 MG/ML
1.8 INJECTION SUBCUTANEOUS DAILY
Qty: 27 ML | Refills: 3 | Status: SHIPPED | OUTPATIENT
Start: 2020-07-17 | End: 2021-01-25 | Stop reason: SDUPTHER

## 2020-07-17 RX ORDER — INSULIN GLARGINE AND LIXISENATIDE 100; 33 U/ML; UG/ML
20-25 INJECTION, SOLUTION SUBCUTANEOUS DAILY
Qty: 15 ML | Refills: 11 | Status: SHIPPED | OUTPATIENT
Start: 2020-07-17 | End: 2020-10-15

## 2020-07-17 NOTE — PROGRESS NOTES
Subjective:      Patient ID: Epifanio Godoy is a 60 y.o. male.    Chief Complaint: Diabetes (Voctoza refill )    Disclaimer:  This note is prepared using voice recognition software and as such is likely to have errors and has not been proof read. Please contact me for questions.     Patient is here not only for his prevention wellness exam but also because he is needing some issues addressed. Was in [place] for work, but able to come back for 4 days.  Flew in just recently.  Was unable to get his Victoza filled through his insurance.  We did submit a prior authorization but was not allowing it to go through.  He is also currently on Lantus at 20 units a day.  Also on metformin as well.  Uncertain level of control however previously was fairly controlled. Currently has been out of state and unable to get his Victoza.  Needing to get this again for his diabetes. Running out of it currently.      Also needing to do blood work.  Also needing to do a colonoscopy has a history of colon polyps last done in 2013.  Willing to set this up from when he returns back from [place].    Needing to get eye exam also from diabetes standpoint however being out of state this is difficult for him.    Blood pressure is controlled today on the losartan.  Willing to do lab work today.    He had requested to have amoxicillin some back in because he continuously battles left ear pain and what he feels is possibly an infection.  Has a lot of pressure as well as some hearing loss.  He has had a history of some recurrent ear infections he just recently came from [place] back to [place].  His right ear has evidence of a perforated ear drum.  Willing to see ENT if possible today since he will be leaving out of town soon          Lab Results   Component Value Date    WBC 3.96 10/16/2019    HGB 13.2 (L) 10/16/2019    HCT 42.7 10/16/2019     10/16/2019    CHOL 169 10/16/2019    TRIG 123 10/16/2019    HDL 40 10/16/2019    ALT 24  10/16/2019    AST 24 10/16/2019     10/16/2019    K 4.6 10/16/2019     10/16/2019    CREATININE 1.0 10/16/2019    BUN 10 10/16/2019    CO2 29 10/16/2019    TSH 1.095 10/16/2019    PSA 0.23 07/31/2018    HGBA1C 6.3 (H) 10/16/2019       X-Ray Cervical Spine AP And Lateral  Narrative: AP and lateral views of the cervical spine    Findings: The vertebral bodies demonstrate normal height.  There is some minimal straightening of the normal curvature. There is minimal disc space narrowing at the C5-C6 and C6-C7 levels. The C1-C2 articulation is within normal limits. No prevertebral soft tissue swelling.  Impression:  As above     Electronically signed by: URIEL RAMIREZ D.O.  Date:     04/10/17  Time:    10:45         Review of Systems   Constitutional: Negative for chills, fatigue and unexpected weight change.   HENT: Positive for ear pain and hearing loss. Negative for congestion, postnasal drip, sneezing, sore throat and trouble swallowing.    Eyes: Negative for pain and visual disturbance.   Respiratory: Negative for cough and shortness of breath.    Cardiovascular: Negative for chest pain and leg swelling.   Gastrointestinal: Negative for abdominal pain, constipation, diarrhea, nausea and vomiting.   Endocrine: Negative for cold intolerance and heat intolerance.   Genitourinary: Negative for difficulty urinating, dysuria and flank pain.   Musculoskeletal: Negative for arthralgias, back pain, joint swelling and neck pain.   Skin: Negative for color change and rash.   Neurological: Negative for dizziness, seizures and headaches.   Psychiatric/Behavioral: Negative for behavioral problems, dysphoric mood and sleep disturbance. The patient is not nervous/anxious.      Objective:     Vitals:    07/17/20 0856   BP: (!) 160/80   Pulse: 82   Temp: 98 °F (36.7 °C)   SpO2: 98%   Weight: 96.6 kg (212 lb 15.4 oz)     Physical Exam  Vitals signs reviewed.   Constitutional:       General: He is not in acute distress.      Appearance: Normal appearance. He is well-developed and normal weight.   HENT:      Head: Normocephalic and atraumatic.      Right Ear: External ear normal. Tympanic membrane is perforated.      Left Ear: External ear normal. Tympanic membrane is scarred and erythematous. Tympanic membrane has decreased mobility.      Nose: Nose normal.      Mouth/Throat:      Mouth: Mucous membranes are moist.      Pharynx: Oropharynx is clear.   Eyes:      Conjunctiva/sclera: Conjunctivae normal.      Pupils: Pupils are equal, round, and reactive to light.   Neck:      Musculoskeletal: Normal range of motion and neck supple.      Thyroid: No thyromegaly.   Cardiovascular:      Rate and Rhythm: Normal rate and regular rhythm.      Pulses:           Dorsalis pedis pulses are 2+ on the right side and 2+ on the left side.      Heart sounds: No murmur. No friction rub. No gallop.    Pulmonary:      Effort: Pulmonary effort is normal. No respiratory distress.      Breath sounds: Normal breath sounds.   Abdominal:      General: Bowel sounds are normal. There is no distension.      Palpations: Abdomen is soft.      Tenderness: There is no abdominal tenderness. There is no rebound.   Musculoskeletal: Normal range of motion.      Right foot: Normal range of motion. No deformity.      Left foot: Normal range of motion. No deformity.   Feet:      Right foot:      Protective Sensation: 4 sites tested. 4 sites sensed.      Skin integrity: Warmth present. No ulcer, blister, skin breakdown, erythema, callus or dry skin.      Left foot:      Protective Sensation: 4 sites tested. 4 sites sensed.      Skin integrity: Warmth present. No ulcer, blister, skin breakdown, erythema, callus or dry skin.   Lymphadenopathy:      Cervical: No cervical adenopathy.   Skin:     General: Skin is warm and dry.   Neurological:      General: No focal deficit present.      Mental Status: He is alert and oriented to person, place, and time.      Coordination:  Coordination normal.   Psychiatric:         Attention and Perception: Attention normal.         Mood and Affect: Mood and affect normal.         Speech: Speech normal.         Behavior: Behavior normal.         Thought Content: Thought content normal.         Cognition and Memory: Cognition normal.         Judgment: Judgment normal.       Assessment:     1. Routine general medical examination at a health care facility    2. Type 2 diabetes mellitus without complication, with long-term current use of insulin    3. Hyperlipidemia associated with type 2 diabetes mellitus    4. Colon cancer screening    5. Tobacco abuse    6. Tubular adenoma polyp of rectum    7. Prostate cancer screening    8. Other recurrent acute nonsuppurative otitis media of left ear    9. Blocked ear, left      Plan:   Epifanio was seen today for diabetes.    Diagnoses and all orders for this visit:    Routine general medical examination at a health care facility labs ordered discussed health maintenance update today set up for colonoscopy when he returns  -     TSH; Future  -     Lipid Panel; Future  -     Hemoglobin A1C; Future  -     Comprehensive metabolic panel; Future  -     CBC auto differential; Future  -     Microalbumin/creatinine urine ratio; Future    Type 2 diabetes mellitus without complication, with long-term current use of insulin-labs today will send in for potential combination insulin and GL P 1 combo pen if possible for better cost verses Victoza and Lantus to the [facility] pharmacy.  Please apply any coupons.  -     TSH; Future  -     Lipid Panel; Future  -     Hemoglobin A1C; Future  -     Comprehensive metabolic panel; Future  -     CBC auto differential; Future  -     Microalbumin/creatinine urine ratio; Future  -     liraglutide 0.6 mg/0.1 mL, 18 mg/3 mL, subq PNIJ (VICTOZA 3-JULIETET) 0.6 mg/0.1 mL (18 mg/3 mL) PnIj pen; Inject 1.8 mg into the skin once daily.  -     insulin (LANTUS SOLOSTAR U-100 INSULIN) glargine 100 units/mL (3mL)  SubQ pen; INJECT 10-20 UNITS INTO THE SKIN ONCE DAILY.  -     Ambulatory referral/consult to ENT; Future    Hyperlipidemia associated with type 2 diabetes mellitus-currently on cholesterol medicine lab work today.  -     TSH; Future  -     Lipid Panel; Future  -     Hemoglobin A1C; Future  -     Comprehensive metabolic panel; Future  -     CBC auto differential; Future  -     Microalbumin/creatinine urine ratio; Future    Colon cancer screening schedule for colonoscopy when he returns back  -     TSH; Future  -     Lipid Panel; Future  -     Hemoglobin A1C; Future  -     Comprehensive metabolic panel; Future  -     CBC auto differential; Future  -     Microalbumin/creatinine urine ratio; Future  -     Case request GI: COLONOSCOPY    Tobacco abuse discussed risks working on quitting  -     TSH; Future  -     Lipid Panel; Future  -     Hemoglobin A1C; Future  -     Comprehensive metabolic panel; Future  -     CBC auto differential; Future  -     Microalbumin/creatinine urine ratio; Future  -     Case request GI: COLONOSCOPY    Tubular adenoma polyp of rectum  -     TSH; Future  -     Lipid Panel; Future  -     Hemoglobin A1C; Future  -     Comprehensive metabolic panel; Future  -     CBC auto differential; Future  -     Microalbumin/creatinine urine ratio; Future  -     Case request GI: COLONOSCOPY    Prostate cancer screening  -     PSA, Screening; Future    Other recurrent acute nonsuppurative otitis media of left ear-with evidence of an infusion present today with some hearing loss evidence on the right ear of a perforation will attempt to try to get him in to see ENT 2 days since he is leaving to go out of state again this weekend.  If not then advised to potentially see ENT in [place]  -     Ambulatory referral/consult to ENT; Future    Blocked ear, left  -     Ambulatory referral/consult to ENT; Future    Other orders  -     insulin glargine-lixisenatide (SOLIQUA 100/33) 100 unit-33 mcg/mL InPn; Inject 20 to 25  Units into the skin once daily.    -patient also reports a lot of shoulder discomfort at times as well would recommend him seeing orthopedics or physical therapy when he is [place] again.        Follow up in about 6 months (around 1/17/2021) for chronic issues Dr Shepherd.    There are no Patient Instructions on file for this visit.

## 2020-07-18 LAB
ALBUMIN/CREAT UR: 7.8 UG/MG (ref 0–30)
CREAT UR-MCNC: 141 MG/DL (ref 23–375)
MICROALBUMIN UR DL<=1MG/L-MCNC: 11 UG/ML

## 2020-07-21 ENCOUNTER — TELEPHONE (OUTPATIENT)
Dept: ENDOSCOPY | Facility: HOSPITAL | Age: 60
End: 2020-07-21

## 2020-07-21 NOTE — TELEPHONE ENCOUNTER
Attempted to schedule procedure with patient, he declined.  Stated he was out of town at this time and would call office to schedule upon his return.

## 2020-08-03 ENCOUNTER — TELEPHONE (OUTPATIENT)
Dept: ENDOSCOPY | Facility: HOSPITAL | Age: 60
End: 2020-08-03

## 2020-08-03 NOTE — TELEPHONE ENCOUNTER
Location Screening:    If answers yes to any of the following, schedule at [place] ONLY. If No, OK for either location.    1. Is there a diagnosis of heart failure, severe heart valve disease (aortic stenosis) or mechanical valve? no  a. Is the Left Ventricle Ejection Fraction <30% ? no    2. Does the pt have pulmonary hypertension? no   a. Is pulmonary arterial pressure gradient >50mmHg? no   b. Is there evidence of right ventricular dysfunction? no    3. Does the pt have achalasia? no    4. Any history of negative reaction to anesthesia? no   a. Myasthenia gravis? no   b. Malignant hyperthermia? no   c. Other? no    5. Is procedure for esophageal banding? no      COVID Screening    1. Have you had a fever in the last 7 days or have you used fever reducing medicines for a fever in the last 7 days?  no    2. Are you experiencing shortness of breath, cough, muscle aches, loss of taste or loss of smell?  no    If answered yes to questions 1 and 2, the patient must seek medical attention with their PCP.  Do not schedule their procedure.     3. Are you residing with anyone who has tested positive for Covid?  no    If answered yes to question 3, recommend 14 day self-quarantine from the date of relative's positive test and place special needs note in the depot.  Wait to schedule.     ENDO screening    1. Have you been admitted for cardiac, kidney or pulmonary causes to the hospital in the past 3 months? no   If yes, schedule an appointment with PCP before scheduling endoscopic procedure.     2. Have you had a stent placed in the last 12 months? no   If yes, for a screening visit, cancel and message the ordering provider.  The patient will need a new order when the time is appropriate.     3. Have you had a stroke or heart attack in the past 6 months? no   If yes, cancel and refer patient to ordering provider for clearance, also message ordering provider to inform.     4. Have you had any chest pain in the past 3 months?  "no   If yes, Have you been evaluated by your PCP and/or cardiologist and it was determined to not be heart related? not applicable   If No, Pt needs to be seen by PCP or Cardiologist .  Pt can be scheduled once clearance obtained by either of those providers.     5. Do you take prescription weight loss medications?  no   If yes, must stop for 2 weeks prior to procedure.     6. Have you been diagnosed with diverticulitis within the past 3 months? no   If yes, must have been seen by GI within the last 3 months, if not schedule with GI [facility].    If pt has been seen by GI, schedule procedure 8-12 weeks post antibiotic treatment.     7. Are you on Dialysis? no  If yes, schedule procedure for the day AFTER dialysis.  Appt time should be 9am or later, patient arrival time is 2 hours prior.  Nulytely or miralax prep for all patients with kidney disease.     8. Are you diabetic?  yes   If yes, schedule morning appt. Advise pt to hold all diabetic meds day of procedure.     9. If pt is older than 80 years of age and HAS NOT been seen by GI or PCP within the last 6 months, needs appt with GI [facility].   If pt has been seen by the GI provider or PCP within the past 6  months AND meets criteria, schedule procedure AND send message to the endoscopist.     10. Is patient on a "high risk" medication (blood thinner/antiplatelet agent)?  no   If yes, has cardiac clearance been obtained within the last 60 days? N/A   If no, a new clearance needs to be obtained.     Final Questions:    1.I have reviewed the last colonoscopy for recommendations regarding next procedure bowel prep.  yes  2. I have reviewed medications and allergies.  yes  3. I have verified the pharmacy information and appropriate prep sent if needed. yes  4. Prep instructions have been mailed or sent to portal per patient request. yes    08- mcelroy    All schedulers will have ability to reach out to the ordering GI provider to clarify any issues.       "

## 2020-08-04 ENCOUNTER — PATIENT MESSAGE (OUTPATIENT)
Dept: ENDOSCOPY | Facility: HOSPITAL | Age: 60
End: 2020-08-04

## 2020-08-05 ENCOUNTER — PATIENT MESSAGE (OUTPATIENT)
Dept: ENDOSCOPY | Facility: HOSPITAL | Age: 60
End: 2020-08-05

## 2020-08-05 RX ORDER — SODIUM, POTASSIUM,MAG SULFATES 17.5-3.13G
SOLUTION, RECONSTITUTED, ORAL ORAL
Qty: 354 ML | Refills: 0 | Status: ON HOLD | OUTPATIENT
Start: 2020-08-05 | End: 2020-08-06 | Stop reason: ALTCHOICE

## 2020-08-05 NOTE — TELEPHONE ENCOUNTER
I have signed for the following orders AND/OR meds.  Please call the patient and ask the patient to schedule the testing AND/OR inform about any medications that were sent.      No orders of the defined types were placed in this encounter.      Medications Ordered This Encounter   Medications    sodium,potassium,mag sulfates (SUPREP BOWEL PREP KIT) 17.5-3.13-1.6 gram SolR     Sig: Take as instructed on prep sheet     Dispense:  354 mL     Refill:  0

## 2020-08-06 ENCOUNTER — ANESTHESIA EVENT (OUTPATIENT)
Dept: ENDOSCOPY | Facility: HOSPITAL | Age: 60
End: 2020-08-06
Payer: COMMERCIAL

## 2020-08-06 ENCOUNTER — HOSPITAL ENCOUNTER (OUTPATIENT)
Facility: HOSPITAL | Age: 60
Discharge: HOME | End: 2020-08-06
Attending: FAMILY MEDICINE | Admitting: FAMILY MEDICINE
Payer: COMMERCIAL

## 2020-08-06 ENCOUNTER — ANESTHESIA (OUTPATIENT)
Dept: ENDOSCOPY | Facility: HOSPITAL | Age: 60
End: 2020-08-06
Payer: COMMERCIAL

## 2020-08-06 VITALS
SYSTOLIC BLOOD PRESSURE: 133 MMHG | BODY MASS INDEX: 28.96 KG/M2 | DIASTOLIC BLOOD PRESSURE: 96 MMHG | TEMPERATURE: 98 F | OXYGEN SATURATION: 99 % | RESPIRATION RATE: 18 BRPM | HEART RATE: 66 BPM | WEIGHT: 207.69 LBS

## 2020-08-06 DIAGNOSIS — K57.30 DIVERTICULOSIS OF COLON: ICD-10-CM

## 2020-08-06 DIAGNOSIS — Z12.11 COLON CANCER SCREENING: Primary | ICD-10-CM

## 2020-08-06 DIAGNOSIS — Z86.010 HISTORY OF COLON POLYPS: ICD-10-CM

## 2020-08-06 DIAGNOSIS — K63.5 POLYP OF COLON, UNSPECIFIED PART OF COLON, UNSPECIFIED TYPE: ICD-10-CM

## 2020-08-06 PROBLEM — Z86.0100 HISTORY OF COLON POLYPS: Status: ACTIVE | Noted: 2020-08-06

## 2020-08-06 LAB
POCT GLUCOSE: 116 MG/DL (ref 70–110)
SARS-COV-2 RDRP RESP QL NAA+PROBE: NEGATIVE

## 2020-08-06 PROCEDURE — 88305 TISSUE EXAM BY PATHOLOGIST: ICD-10-PCS | Mod: 26,,, | Performed by: PATHOLOGY

## 2020-08-06 PROCEDURE — 88305 TISSUE EXAM BY PATHOLOGIST: CPT | Performed by: PATHOLOGY

## 2020-08-06 PROCEDURE — 37000008 HC ANESTHESIA 1ST 15 MINUTES: Performed by: FAMILY MEDICINE

## 2020-08-06 PROCEDURE — 45385 PR COLONOSCOPY,REMV LESN,SNARE: ICD-10-PCS | Mod: 33,,, | Performed by: FAMILY MEDICINE

## 2020-08-06 PROCEDURE — 45385 COLONOSCOPY W/LESION REMOVAL: CPT | Mod: 33,,, | Performed by: FAMILY MEDICINE

## 2020-08-06 PROCEDURE — 25000003 PHARM REV CODE 250: Performed by: NURSE ANESTHETIST, CERTIFIED REGISTERED

## 2020-08-06 PROCEDURE — 63600175 PHARM REV CODE 636 W HCPCS: Performed by: NURSE ANESTHETIST, CERTIFIED REGISTERED

## 2020-08-06 PROCEDURE — 45385 COLONOSCOPY W/LESION REMOVAL: CPT | Performed by: FAMILY MEDICINE

## 2020-08-06 PROCEDURE — 27201089 HC SNARE, DISP (ANY): Performed by: FAMILY MEDICINE

## 2020-08-06 PROCEDURE — 82962 GLUCOSE BLOOD TEST: CPT | Performed by: FAMILY MEDICINE

## 2020-08-06 PROCEDURE — U0002 COVID-19 LAB TEST NON-CDC: HCPCS

## 2020-08-06 PROCEDURE — 88305 TISSUE EXAM BY PATHOLOGIST: CPT | Mod: 26,,, | Performed by: PATHOLOGY

## 2020-08-06 PROCEDURE — 37000009 HC ANESTHESIA EA ADD 15 MINS: Performed by: FAMILY MEDICINE

## 2020-08-06 RX ORDER — SODIUM CHLORIDE, SODIUM LACTATE, POTASSIUM CHLORIDE, CALCIUM CHLORIDE 600; 310; 30; 20 MG/100ML; MG/100ML; MG/100ML; MG/100ML
INJECTION, SOLUTION INTRAVENOUS CONTINUOUS PRN
Status: DISCONTINUED | OUTPATIENT
Start: 2020-08-06 | End: 2020-08-06

## 2020-08-06 RX ORDER — PROPOFOL 10 MG/ML
INJECTION, EMULSION INTRAVENOUS
Status: DISCONTINUED | OUTPATIENT
Start: 2020-08-06 | End: 2020-08-06

## 2020-08-06 RX ORDER — LIDOCAINE HCL/PF 100 MG/5ML
SYRINGE (ML) INTRAVENOUS
Status: DISCONTINUED | OUTPATIENT
Start: 2020-08-06 | End: 2020-08-06

## 2020-08-06 RX ORDER — SODIUM CHLORIDE 0.9 % (FLUSH) 0.9 %
10 SYRINGE (ML) INJECTION
Status: DISCONTINUED | OUTPATIENT
Start: 2020-08-06 | End: 2020-08-06 | Stop reason: HOSPADM

## 2020-08-06 RX ADMIN — PROPOFOL 50 MG: 10 INJECTION, EMULSION INTRAVENOUS at 11:08

## 2020-08-06 RX ADMIN — SODIUM CHLORIDE, SODIUM LACTATE, POTASSIUM CHLORIDE, AND CALCIUM CHLORIDE: 600; 310; 30; 20 INJECTION, SOLUTION INTRAVENOUS at 11:08

## 2020-08-06 RX ADMIN — PROPOFOL 100 MG: 10 INJECTION, EMULSION INTRAVENOUS at 11:08

## 2020-08-06 RX ADMIN — LIDOCAINE HYDROCHLORIDE 50 MG: 20 INJECTION, SOLUTION INTRAVENOUS at 11:08

## 2020-08-06 NOTE — DISCHARGE INSTRUCTIONS
Understanding Diverticulosis and Diverticulitis     Pouches or diverticula usually occur in the lower part of the colon called the sigmoid.     The colon (large intestine) is the last part of the digestive tract. It absorbs water from stool and changes it from a liquid to a solid. In certain cases, small pouches called diverticula can form in the colon wall. This condition is called diverticulosis. The pouches can become infected. If this happens, it becomes a more serious problem called diverticulitis. These problems can be painful. But they can be managed.  Managing your condition  Diet changes or medicines may be prescribed.   If you have diverticulosis  Recommendations include:  · Diet changes are often enough to control symptoms. The main changes are adding fiber (roughage) and drinking more water. Fiber absorbs water as it travels through your colon. This helps your stool stay soft and move smoothly. Water helps this process.  · If needed, you may be told to take over-the-counter stool softeners.  · To help relieve pain, antispasmodic medicines may be prescribed.  · Watch for changes in your bowel movements. Tell the healthcare provider if you notice any changes.  · Begin an exercise program. Ask your healthcare provider how to get started.  · Get plenty of rest and sleep.   If you have diverticulitis  Treatment depends on how bad your symptoms are.  · For mild symptoms. You may be put on a liquid diet for a short time. Antibiotics are usually prescribed. If these two steps relieve your symptoms, you may then be prescribed a high-fiber diet. If you still have symptoms, your healthcare provider will discuss more treatment choices with you.  · For severe symptoms. You may need to be admitted to the hospital. There, you can be given IV antibiotics and fluids. You will also be put on a low-fiber or liquid diet. Although not common, surgery is needed in some people with severe symptoms.  [place] to colon health      Diverticulitis occurs when the pouches become infected or inflamed.     Help keep your colon healthy with a diet that includes plenty of high-fiber fruits, vegetables, and whole grains. Drink plenty of liquids like water and juice. Maintain a healthy lifestyle including regular exercise, stress management, and adequate rest and sleep.   Date Last Reviewed: 7/1/2016  © 0354-7531 [employer], [employer]. [address] [place] [address], [place] [address]. All rights reserved. This information is not intended as a substitute for professional medical care. Always follow your healthcare professional's instructions.

## 2020-08-06 NOTE — ANESTHESIA PREPROCEDURE EVALUATION
08/06/2020  Epifanio Godoy is a 60 y.o., male.    Anesthesia Evaluation    I have reviewed the Patient Summary Reports.   I have reviewed the NPO Status.   I have reviewed the Medications.     Review of Systems  Anesthesia Hx:  No previous Anesthesia  Denies Family Hx of Anesthesia complications.   Denies Personal Hx of Anesthesia complications.   Social:  No Alcohol Use, Smoker    Hematology/Oncology:  Hematology Normal   Oncology Normal     EENT/Dental:EENT/Dental Normal   Cardiovascular:   Hypertension    Pulmonary:  Pulmonary Normal    Renal/:   Chronic Renal Disease    Hepatic/GI:  Hepatic/GI Normal    Musculoskeletal:   Arthritis     Neurological:  Neurology Normal    Endocrine:   Diabetes, well controlled, type 2    Dermatological:  Skin Normal    Psych:  Psychiatric Normal           Physical Exam  General:  Well nourished    Airway/Jaw/Neck:  Airway Findings: Mouth Opening: Normal Tongue: Normal  General Airway Assessment: Adult  Mallampati: II  TM Distance: 4 - 6 cm  Jaw/Neck Findings:  Neck ROM: Normal ROM      Dental:  Dental Findings: In tact   Chest/Lungs:  Chest/Lungs Findings: Clear to auscultation, Normal Respiratory Rate     Heart/Vascular:  Heart Findings: Rate: Normal  Rhythm: Regular Rhythm  Sounds: Normal        Mental Status:  Mental Status Findings:  Cooperative, Alert and Oriented         Anesthesia Plan  Type of Anesthesia, risks & benefits discussed:  Anesthesia Type:  MAC  Patient's Preference:   Intra-op Monitoring Plan: standard ASA monitors  Intra-op Monitoring Plan Comments:   Post Op Pain Control Plan: per primary service following discharge from PACU  Post Op Pain Control Plan Comments:   Induction:   IV  Beta Blocker:  Patient is not currently on a Beta-Blocker (No further documentation required).       Informed Consent: Patient understands risks and agrees with Anesthesia  plan.  Questions answered. Anesthesia consent signed with patient.  ASA Score: 2     Day of Surgery Review of History & Physical: I have interviewed and examined the patient. I have reviewed the patient's H&P dated:            Ready For Surgery From Anesthesia Perspective.

## 2020-08-06 NOTE — [employer] PATIENT INSTRUCTIONS
Discharge Summary/Instructions after an Endoscopic Procedure  Patient Name: Epifanio Godoy  Patient MRN: [id number]  Patient YOB: 1960 Thursday, August 6, 2020 Stalin Williamson MD  RESTRICTIONS:  During your procedure today, you received medications for sedation.  These   medications may affect your judgment, balance and coordination.  Therefore,   for 24 hours, you have the following restrictions:   - DO NOT drive a car, operate machinery, make legal/financial decisions,   sign important papers or drink alcohol.    ACTIVITY:  Today: no heavy lifting, straining or running due to procedural   sedation/anesthesia.  The following day: return to full activity including work.  DIET:  Eat and drink normally unless instructed otherwise.     TREATMENT FOR COMMON SIDE EFFECTS:  - Mild abdominal pain, nausea, belching, bloating or excessive gas:  rest,   eat lightly and use a heating pad.  - Sore Throat: treat with throat lozenges and/or gargle with warm salt   water.  - Because air was used during the procedure, expelling large amounts of air   from your rectum or belching is normal.  - If a bowel prep was taken, you may not have a bowel movement for 1-3 days.    This is normal.  SYMPTOMS TO WATCH FOR AND REPORT TO YOUR PHYSICIAN:  1. Abdominal pain or bloating, other than gas cramps.  2. Chest pain.  3. Back pain.  4. Signs of infection such as: chills or fever occurring within 24 hours   after the procedure.  5. Rectal bleeding, which would show as bright red, maroon, or black stools.   (A tablespoon of blood from the rectum is not serious, especially if   hemorrhoids are present.)  6. Vomiting.  7. Weakness or dizziness.  GO DIRECTLY TO THE NEAREST EMERGENCY ROOM IF YOU HAVE ANY OF THE FOLLOWING:      Difficulty breathing              Chills and/or fever over 101 F   Persistent vomiting and/or vomiting blood   Severe abdominal pain   Severe chest pain   Black, tarry stools   Bleeding- more than one tablespoon   Any  other symptom or condition that you feel may need urgent attention  Your doctor recommends these additional instructions:  If any biopsies were taken, your doctors clinic will contact you in 1 to 2   weeks with any results.  - Patient has a contact number available for emergencies.  The signs and   symptoms of potential delayed complications were discussed with the   patient.  Return to normal activities tomorrow.  Written discharge   instructions were provided to the patient.   - Resume previous diet.   - Continue present medications.   - Await pathology results.   - Repeat colonoscopy in 5 years for surveillance.   - Telephone my office for pathology results in 2 weeks.   - Discharge patient to home (via wheelchair).  For questions, problems or results please call your physician Stalin Williamson MD at Work:  [phone]  If you have any questions about the above instructions, call the GI   department at [phone] or call the endoscopy unit at [phone]   from 7am until 3 pm.  [facility], EMERGENCY ROOM PHONE NUMBER:   [phone]  IF A COMPLICATION OR EMERGENCY SITUATION ARISES AND YOU ARE UNABLE TO REACH   YOUR PHYSICIAN - GO DIRECTLY TO THE EMERGENCY ROOM.  I have read or have had read to me these discharge instructions for my   procedure and have received a written copy.  I understand these   instructions and will follow-up with my physician if I have any questions.     __________________________________       _____________________________________  Nurse Signature                                          Patient/Designated   Responsible Party Signature  Stalin Williamson MD  8/6/2020 11:39:52 AM  This report has been verified and signed electronically.  [employer]

## 2020-08-06 NOTE — ANESTHESIA POSTPROCEDURE EVALUATION
Anesthesia Post Evaluation    Patient: Epifanio Godoy    Procedure(s) Performed: Procedure(s) (LRB):  COLONOSCOP)    Final Anesthesia Type: MAC    Patient location during evaluation: GI PACU  Patient participation: Yes- Able to Participate  Level of consciousness: awake and alert  Post-procedure vital signs: reviewed and stable  Pain management: adequate  Airway patency: patent  TYRONE mitigation strategies: Extubation while patient is awake  PONV status at discharge: No PONV  Anesthetic complications: no      Cardiovascular status: hemodynamically stable  Respiratory status: spontaneous ventilation  Hydration status: euvolemic  Follow-up not needed.          Vitals Value Taken Time   /96 08/06/20 1210   Temp 97 08/06/20 1243   Pulse 66 08/06/20 1210   Resp 18 08/06/20 1210   SpO2 99 % 08/06/20 1210         Event Time   Out of Recovery 12:17:44         Pain/Clayton Score: Clayton Score: 10 (8/6/2020 12:10 PM)

## 2020-08-06 NOTE — DISCHARGE SUMMARY
Endoscopy Discharge Summary      Admit Date: 8/6/2020    Discharge Date and Time:  8/6/2020 11:42 AM    Attending Physician: Stalin Williamson MD     Discharge Physician: Stalin Williamson MD     Principal Admitting Diagnoses: Colon cancer screening         Discharge Diagnosis: The primary encounter diagnosis was Colon cancer screening. Diagnoses of Polyp of colon, unspecified part of colon, unspecified type, History of colon polyps, and Diverticulosis of colon were also pertinent to this visit.     Discharged Condition: Good    Indication for Admission: Colon cancer screening     Hospital Course: Patient was admitted for an inpatient procedure and tolerated the procedure well with no complications.    Significant Diagnostic Studies: Colonoscopy with cold snare polypectomy    Pathology (if any):  Specimen (12h ago, onward)    None          Estimated Blood Loss: 0 ml.    Discussed with: patient and family.    Disposition: Home.    Follow Up/Patient Instructions:   Current Discharge Medication List      CONTINUE these medications which have NOT CHANGED    Details   atorvastatin (LIPITOR) 40 MG tablet Take 1 tablet (40 mg total) by mouth once daily.  Qty: 90 tablet, Refills: 3    Associated Diagnoses: Hyperlipidemia associated with type 2 diabetes mellitus      insulin (LANTUS SOLOSTAR U-100 INSULIN) glargine 100 units/mL (3mL) SubQ pen INJECT 10-20 UNITS INTO THE SKIN ONCE DAILY.  Qty: 45 mL, Refills: 3    Comments:  Pt wants 90 day supply  Associated Diagnoses: Type 2 diabetes mellitus without complication, with long-term current use of insulin      liraglutide 0.6 mg/0.1 mL, 18 mg/3 mL, subq PNIJ (VICTOZA 3-JULIETTE) 0.6 mg/0.1 mL (18 mg/3 mL) PnIj pen Inject 1.8 mg into the skin once daily.  Qty: 27 mL, Refills: 3    Comments: Pt failed glimeperide and trajenta. On metformin and lantus. Please dispense 90 day supply if possible  Associated Diagnoses: Type 2 diabetes mellitus without complication, with long-term current  "use of insulin      losartan (COZAAR) 100 MG tablet Take 1 tablet (100 mg total) by mouth once daily. 1 Tablet Oral Every day  Qty: 90 tablet, Refills: 3    Comments: .  Associated Diagnoses: Type 2 diabetes mellitus without complication, with long-term current use of insulin      ofloxacin (OCUFLOX) 0.3 % ophthalmic solution 2 drops into bilateral ears tid prn.  Qty: 10 mL, Refills: 0      fluticasone propionate (FLONASE) 50 mcg/actuation nasal spray SHAKE LIQUID AND USE 2 SPRAYS(100 MCG) IN EACH NOSTRIL EVERY DAY  Qty: 16 mL, Refills: 11      insulin glargine-lixisenatide (SOLIQUA 100/33) 100 unit-33 mcg/mL InPn Inject 20 to 25 Units into the skin once daily.  Qty: 15 mL, Refills: 11    Comments: See if this is possibly an option with even a coupon for patient to replace the lantus and the victoza. If not covered, please fill the lantus and victoza instead ( or other option if more afforable for patient).      pen needle, diabetic (BD ULTRA-FINE MINI PEN NEEDLE) 31 gauge x 3/16" Ndle USE 3 TIMES PER DAY  Qty: 100 each, Refills: 10    Associated Diagnoses: Type 2 diabetes mellitus without complication, with long-term current use of insulin             Discharge Procedure Orders   Diet general     Call MD for:  temperature >100.4     Call MD for:  persistent nausea and vomiting     Call MD for:  severe uncontrolled pain     Call MD for:  difficulty breathing, headache or visual disturbances     Call MD for:  redness, tenderness, or signs of infection (pain, swelling, redness, odor or green/yellow discharge around incision site)     Call MD for:  hives     Call MD for:  persistent dizziness or light-headedness     No dressing needed       Follow-up Information     Stalin Williamson MD. Call in 2 weeks.    Specialty: Family Medicine  Why: To receive pathology results.  Contact information:  [address] [address] [phone]                         "

## 2020-08-06 NOTE — H&P
Short Stay Endoscopy History and Physical    PCP - Holly Shepherd MD    Procedure - Colonoscopy  ASA - 2  Mallampati - per anesthesia  History of Anesthesia problems - no  Family history Anesthesia problems -  no     HPI:  This is a 60 y.o. male here for evaluation of :   Active Hospital Problems    Diagnosis  POA    *Colon cancer screening [Z12.11]  Not Applicable    History of colon polyps [Z86.010]  Not Applicable      Resolved Hospital Problems   No resolved problems to display.         Health Maintenance       Date Due Completion Date    HIV Screening 04/01/1977 ---    Shingles Vaccine (1 of 2) 04/01/2012 ---    Eye Exam 01/17/2018 1/17/2017 (Done)    Override on 1/17/2017: Done ([facility]--- NO BDR NOTED)    Colorectal Cancer Screening 10/16/2018 10/16/2013    Influenza Vaccine (1) 09/01/2020 10/16/2019    Hemoglobin A1c 01/17/2021 7/17/2020    PROSTATE-SPECIFIC ANTIGEN 07/17/2021 7/17/2020    Foot Exam 07/17/2021 7/17/2020    Override on 7/17/2020: Done    Override on 10/16/2019: Done    Override on 7/31/2018: Done    Override on 10/6/2014: Done    Lipid Panel 07/17/2021 7/17/2020    Urine Microalbumin 07/17/2021 7/17/2020    Low Dose Statin 08/06/2021 8/6/2020    TETANUS VACCINE 07/31/2028 7/31/2018          Screening - Yes  History of polyps - yes  Diarrhea - no  Anemia - no  Blood in stools - no  Abdominal pain - no  Other - no    ROS:  CONSTITUTIONAL: Denies weight change,  fatigue, fevers, chills, night sweats.  CARDIOVASCULAR: Denies chest pain, shortness of breath, orthopnea and edema.  RESPIRATORY: Denies cough, hemoptysis, dyspnea, and wheezing.  GI: See HPI.    Medical History:   Past Medical History:   Diagnosis Date    Diabetes mellitus type II     Hypertension     Kidney stones     Type II or unspecified type diabetes mellitus without mention of complication, not stated as uncontrolled        Surgical History:   History reviewed. No pertinent surgical history.    Family  History:   Family History   Problem Relation Age of Onset    Hypertension Mother     Diabetes Mother     Hypertension Father     Diabetes Father        Social History:   Social History     Tobacco Use    Smoking status: Current Every Day Smoker     Packs/day: 0.50     Years: 20.00     Pack years: 10.00     Types: Cigarettes    Smokeless tobacco: Never Used   Substance Use Topics    Alcohol use: No     Alcohol/week: 2.5 standard drinks     Types: 3 Standard drinks or equivalent per week    Drug use: No       Allergies:   Review of patient's allergies indicates:   Allergen Reactions    Bactrim  [sulfamethoxazole-trimethoprim]      Other reaction(s): Swelling  Other reaction(s): [place]    Clindamycin      Other reaction(s): Swelling    Sulfa (sulfonamide antibiotics)        Medications:   No current facility-administered medications on file prior to encounter.      Current Outpatient Medications on File Prior to Encounter   Medication Sig Dispense Refill    atorvastatin (LIPITOR) 40 MG tablet Take 1 tablet (40 mg total) by mouth once daily. 90 tablet 3    insulin (LANTUS SOLOSTAR U-100 INSULIN) glargine 100 units/mL (3mL) SubQ pen INJECT 10-20 UNITS INTO THE SKIN ONCE DAILY. 45 mL 3    liraglutide 0.6 mg/0.1 mL, 18 mg/3 mL, subq PNIJ (VICTOZA 3-JULIETTE) 0.6 mg/0.1 mL (18 mg/3 mL) PnIj pen Inject 1.8 mg into the skin once daily. 27 mL 3    losartan (COZAAR) 100 MG tablet Take 1 tablet (100 mg total) by mouth once daily. 1 Tablet Oral Every day 90 tablet 3    ofloxacin (OCUFLOX) 0.3 % ophthalmic solution 2 drops into bilateral ears tid prn. 10 mL 0    fluticasone propionate (FLONASE) 50 mcg/actuation nasal spray SHAKE LIQUID AND USE 2 SPRAYS(100 MCG) IN EACH NOSTRIL EVERY DAY (Patient not taking: Reported on 7/17/2020) 16 mL 11    insulin glargine-lixisenatide (SOLIQUA 100/33) 100 unit-33 mcg/mL InPn Inject 20 to 25 Units into the skin once daily. 15 mL 11    pen needle, diabetic (BD ULTRA-FINE  "MINI PEN NEEDLE) 31 gauge x 3/16" Ndle USE 3 TIMES PER  each 10       Physical Exam:  Vital Signs:   Vitals:    08/06/20 1026   BP: 137/81   Pulse: 71   Resp: 16   Temp: 97.7 °F (36.5 °C)     General Appearance: Well appearing in no acute distress  ENT: OP clear  Chest: CTA B  CV: RRR, no m/r/g  Abd: s/nt/nd/nabs  Ext: no edema    Labs:Reviewed    IMP:  Active Hospital Problems    Diagnosis  POA    *Colon cancer screening [Z12.11]  Not Applicable    History of colon polyps [Z86.010]  Not Applicable      Resolved Hospital Problems   No resolved problems to display.         Plan:   I have explained the risks and benefits of colonoscopy to the patient including but not limited to bleeding, perforation, infection, and death. The patient wishes to proceed.    "

## 2020-08-07 ENCOUNTER — TELEPHONE (OUTPATIENT)
Dept: PHARMACY | Facility: CLINIC | Age: 60
End: 2020-08-07

## 2020-08-07 NOTE — TELEPHONE ENCOUNTER
Notified pt Azar AGUILAR in process with [employer].      Requested fax form 2x, as soon as form sent, will send information to [employer].    PT verbalized understanding.    Thank You!   Aurora Victoria CPhT, [email or web address]  Patient Care Advocate   [facility]  Phone: [phone] Ext [phone]  Fax: [phone]

## 2020-08-10 ENCOUNTER — TELEPHONE (OUTPATIENT)
Dept: PHARMACY | Facility: CLINIC | Age: 60
End: 2020-08-10

## 2020-08-10 NOTE — TELEPHONE ENCOUNTER
PT notified that Victoza is covered under secondary insurance for 90 day supply with copayment of $39.99.    With Primary he will have to change to another medication and insurance would only cover a 30 day supply.    Patient wants to fill under secondary.    Thank You!   Aurora Victoria CPhT, B.A  Patient Care Advocate   [facility]  Phone: [phone] Ext [phone]  Fax: [phone]

## 2020-08-11 LAB
FINAL PATHOLOGIC DIAGNOSIS: NORMAL
GROSS: NORMAL

## 2020-08-12 NOTE — PROGRESS NOTES
Dear Holly Shepherd MD,    I recently cared for Epifanio Godoy and performed an endoscopy.  Tissue was sent for pathology evaluation and I will have a letter written to ask the patient to repeat the colonoscopy in 5 years.  The pathology showed that there was adenomatous tissue present.  Thank you for allowing me to participate in the care of your patient.  Please call me for any questions that you might have.      Dr. Stalin Williamson  [phone] cell  [phone] office      NURSING STAFF:Please  inform the patient that I reviewed the recent pathology obtained at the time of colonoscopy.    The results showed that there was adenomatous tissue present which is benign and based on that, I recommend that the patient have a repeat colonoscopy performed in 5 years.     If the patient has MyChart, this message has been sent to them.  Confirm that they read the note.  If not, copy the information and print a letter to send to the patient at this time.  Confirm that a notation to the PCP was done.      Dear Epifanio Godoy,    This is to inform you that I have reviewed your recent colonoscopy pathology.  The results showed that you had adenomatous tissue present which is benign and based on that, I recommend that you have a repeat colonoscopy performed in 5 years.      Dr. Stalin Williamson  [phone]

## 2020-08-19 ENCOUNTER — TELEPHONE (OUTPATIENT)
Dept: PRIMARY CARE CLINIC | Facility: CLINIC | Age: 60
End: 2020-08-19

## 2020-08-19 NOTE — TELEPHONE ENCOUNTER
----- Message from Meet Alberto sent at 8/19/2020 10:22 AM CDT -----  ..Type:  Patient Returning Call    Who Called pt   Who Left Message for Patient:  Does the patient know what this is regarding?: appt   Would the patient rather a call back or a response via [facility]?  Call back   Best Call Back Number:[phone]  Additional Information:  pt is requesting a call from nurse to discuss a sooner appt

## 2020-08-19 NOTE — TELEPHONE ENCOUNTER
----- Message from Jerosn Kilgore sent at 8/19/2020  2:33 PM CDT -----  .Type:  Patient Returning Call    Who Called:Epifanio Garcia  Who Left Message for Patient:Emiila  Does the patient know what this is regarding?:returning pt's call  Would the patient rather a call back or a response via [facility]? Call back  Best Call Back Number:[phone]  Additional Information:

## 2020-08-26 ENCOUNTER — PATIENT OUTREACH (OUTPATIENT)
Dept: ADMINISTRATIVE | Facility: OTHER | Age: 60
End: 2020-08-26

## 2020-08-26 ENCOUNTER — PATIENT MESSAGE (OUTPATIENT)
Dept: PRIMARY CARE CLINIC | Facility: CLINIC | Age: 60
End: 2020-08-26

## 2020-08-26 DIAGNOSIS — M25.519 SHOULDER PAIN, UNSPECIFIED CHRONICITY, UNSPECIFIED LATERALITY: Primary | ICD-10-CM

## 2020-08-26 NOTE — TELEPHONE ENCOUNTER
Patient was schedule to see Holly Berry last week but no showed his appt. Tried to call but no answer and no vm could be left.

## 2020-08-27 ENCOUNTER — HOSPITAL ENCOUNTER (OUTPATIENT)
Dept: RADIOLOGY | Facility: HOSPITAL | Age: 60
Discharge: HOME | End: 2020-08-27
Attending: PHYSICIAN ASSISTANT
Payer: COMMERCIAL

## 2020-08-27 ENCOUNTER — OFFICE VISIT (OUTPATIENT)
Dept: ORTHOPEDICS | Facility: CLINIC | Age: 60
End: 2020-08-27
Payer: COMMERCIAL

## 2020-08-27 VITALS
WEIGHT: 216.81 LBS | HEIGHT: 71 IN | DIASTOLIC BLOOD PRESSURE: 85 MMHG | BODY MASS INDEX: 30.35 KG/M2 | HEART RATE: 78 BPM | SYSTOLIC BLOOD PRESSURE: 132 MMHG

## 2020-08-27 DIAGNOSIS — M25.519 SHOULDER PAIN, UNSPECIFIED CHRONICITY, UNSPECIFIED LATERALITY: ICD-10-CM

## 2020-08-27 DIAGNOSIS — M25.312 INSTABILITY OF LEFT SHOULDER JOINT: Primary | ICD-10-CM

## 2020-08-27 DIAGNOSIS — M25.512 ACUTE PAIN OF LEFT SHOULDER: ICD-10-CM

## 2020-08-27 PROCEDURE — 73030 XR SHOULDER COMPLETE 2 OR MORE VIEWS LEFT: ICD-10-PCS | Mod: 26,LT,, | Performed by: RADIOLOGY

## 2020-08-27 PROCEDURE — 73030 X-RAY EXAM OF SHOULDER: CPT | Mod: 26,LT,, | Performed by: RADIOLOGY

## 2020-08-27 PROCEDURE — 99203 OFFICE O/P NEW LOW 30 MIN: CPT | Mod: S$GLB,,, | Performed by: PHYSICIAN ASSISTANT

## 2020-08-27 PROCEDURE — 73030 X-RAY EXAM OF SHOULDER: CPT | Mod: TC,LT

## 2020-08-27 PROCEDURE — 99203 PR OFFICE/OUTPT VISIT, NEW, LEVL III, 30-44 MIN: ICD-10-PCS | Mod: S$GLB,,, | Performed by: PHYSICIAN ASSISTANT

## 2020-08-27 PROCEDURE — 99999 PR PBB SHADOW E&M-EST. PATIENT-LVL IV: ICD-10-PCS | Mod: PBBFAC,,, | Performed by: PHYSICIAN ASSISTANT

## 2020-08-27 PROCEDURE — 99999 PR PBB SHADOW E&M-EST. PATIENT-LVL IV: CPT | Mod: PBBFAC,,, | Performed by: PHYSICIAN ASSISTANT

## 2020-08-27 NOTE — PROGRESS NOTES
SUBJECTIVE:     Chief Complaint & History of Present Illness:  Epifanio Godoy is a  New  patient 60 y.o. male who is seen here today with a complaint of    Chief Complaint   Patient presents with    Left Shoulder - Pain, Injury    .  Here today for evaluation treatment of sudden onset pain in his left shoulder approximately 3 months ago.  Patient states he fell on an outstretched arm of his left shoulder and developed immediate pain soreness and decreased range of motion strength of that shoulder.  He has tried to treat conservatively since time of injury but has the inability to raise his arm greater than 50° and cannot resist any pressure to the arm has pain in the anterior and lateral aspects of the shoulder.  Has had chiropractic care and over-the-counter NSAIDs with poor results  On a scale of 1-10, with 10 being worst pain imaginable, he rates this pain as 4 on good days and 9 on bad days.  he describes the pain as tender and sore.    Review of patient's allergies indicates:   Allergen Reactions    Bactrim  [sulfamethoxazole-trimethoprim]      Other reaction(s): Swelling  Other reaction(s): [place]    Clindamycin      Other reaction(s): Swelling    Sulfa (sulfonamide antibiotics)          Current Outpatient Medications   Medication Sig Dispense Refill    atorvastatin (LIPITOR) 40 MG tablet Take 1 tablet (40 mg total) by mouth once daily. 90 tablet 3    fluticasone propionate (FLONASE) 50 mcg/actuation nasal spray SHAKE LIQUID AND USE 2 SPRAYS(100 MCG) IN EACH NOSTRIL EVERY DAY 16 mL 11    insulin (LANTUS SOLOSTAR U-100 INSULIN) glargine 100 units/mL (3mL) SubQ pen INJECT 10-20 UNITS INTO THE SKIN ONCE DAILY. 45 mL 3    insulin glargine-lixisenatide (SOLIQUA 100/33) 100 unit-33 mcg/mL InPn Inject 20 to 25 Units into the skin once daily. 15 mL 11    liraglutide 0.6 mg/0.1 mL, 18 mg/3 mL, subq PNIJ (VICTOZA 3-JULIETTE) 0.6 mg/0.1 mL (18 mg/3 mL) PnIj pen Inject 1.8 mg into the skin once daily. 27  "mL 3    losartan (COZAAR) 100 MG tablet Take 1 tablet (100 mg total) by mouth once daily. 1 Tablet Oral Every day 90 tablet 3    ofloxacin (OCUFLOX) 0.3 % ophthalmic solution 2 drops into bilateral ears tid prn. 10 mL 0    pen needle, diabetic (BD ULTRA-FINE MINI PEN NEEDLE) 31 gauge x 3/16" Ndle USE 3 TIMES PER  each 10     No current facility-administered medications for this visit.        Past Medical History:   Diagnosis Date    Diabetes mellitus type II     Hypertension     Kidney stones     Type II or unspecified type diabetes mellitus without mention of complication, not stated as uncontrolled            Vital Signs (Most Recent)  Vitals:    08/27/20 1400   BP: 132/85   Pulse: 78       Review of Systems:  ROS:  Constitutional: no fever or chills  Eyes: no visual changes  ENT: no nasal congestion or sore throat  Respiratory: no cough or shortness of breath  Cardiovascular: no chest pain or palpitations  Gastrointestinal: no nausea or vomiting, tolerating diet, Positive tubular adenoma polyp of the rectum, diverticulosis  Genitourinary: no hematuria or dysuria, Positive for renal calculi  Integument/Breast: no rash or pruritis  Hematologic/Lymphatic: no easy bruising or lymphadenopathy  Musculoskeletal: no arthralgias or myalgias  Neurological: no seizures or tremors, Degenerative disc disease lumbar spine  Behavioral/Psych: no auditory or visual hallucinations  Endocrine: no heat or cold intolerance, Positive diabetes type 2      OBJECTIVE:     PHYSICAL EXAM:  Height: 5' 11" (180.3 cm) Weight: 98.4 kg (216 lb 13.2 oz), General Appearance: Well nourished, well developed, in no acute distress.  Neurological: Mood & affect are normal.  Shoulder exam: left  Tenderness: biceps tendon, lateral acromial, posterior acromial, deltoid muscle, trapezius muscle  ROM: forward flexion 80 / 80, extension 70 / 70, full abduction 70 / 70, abduction - glenohumeral 70 / 70, external rotation 35 / 35, internal " rotation mid sagittal line  Shoulder Strength: biceps 5/5, triceps 5/5, abduction 4/5, adduction 4/5, external rotation 4/5 with shoulder at side, flexion 4/5, and extension 4/5  positive for tenderness about the glenohumeral joint, positive for tenderness over the acromioclavicular joint and positive for impingement sign  Stability tests: anterior apprehension test positive for pain only and posterior apprehension test positive for pain only  Special Tests:Cross-chest abduction: diffuse pain                     RADIOGRAPHS:  X-rays taken today films reviewed by me demonstrate significant Hill-Sachs deformity at the head of the humerus joint spaces well maintained no evidence of fracture dislocation    ASSESSMENT/PLAN:       ICD-10-CM ICD-9-CM   1. Instability of left shoulder joint  M25.312 718.81   2. Shoulder pain, unspecified chronicity, unspecified laterality  M25.519 719.41   3. Acute pain of left shoulder  M25.512 719.41       Plan: We discussed with the patient at length all the different treatment options available for his leftshoulder including anti-inflammatories, acetaminophen, rest, ice, Physical therapy to include strengthening exercise, occasional cortisone injections for temporary relief, arthroscopic surgical repair, and finally shoulder arthroplasty.   Light of patient's positive exam and x-rays will proceed with MRI of the right shoulder all taught contact the patient following the MRI to discuss treatment plans

## 2020-08-27 NOTE — LETTER
August 27, 2020      Holly Shepherd MD  [address] [place] [address]           [facility] 5th [address]  [address], [address]  [facility] [address]  Phone: [phone]          Patient: Epifanio Godoy   MR Number: [id number]   YOB: 1960   Date of Visit: 8/27/2020       Dear Dr. Holly Shepherd:    Thank you for referring Epifanio Godoy to me for evaluation. Attached you will find relevant portions of my assessment and plan of care.    If you have questions, please do not hesitate to call me. I look forward to following Epifanio Godoy along with you.    Sincerely,    Dennis Varela PA-C    Enclosure  CC:  No Recipients    If you would like to receive this communication electronically, please contact [email or web address] or [phone] to request more information on [employer] access.    For providers and/or their staff who would like to refer a patient to [facility], please contact us through our one-stop-shop provider referral line, [facility], at [phone].    If you feel you have received this communication in error or would no longer like to receive these types of communications, please e-mail [email or web address]

## 2020-09-03 ENCOUNTER — PATIENT OUTREACH (OUTPATIENT)
Dept: ADMINISTRATIVE | Facility: HOSPITAL | Age: 60
End: 2020-09-03

## 2020-09-03 ENCOUNTER — HOSPITAL ENCOUNTER (OUTPATIENT)
Dept: RADIOLOGY | Facility: HOSPITAL | Age: 60
Discharge: HOME | End: 2020-09-03
Attending: PHYSICIAN ASSISTANT
Payer: COMMERCIAL

## 2020-09-03 DIAGNOSIS — M25.512 ACUTE PAIN OF LEFT SHOULDER: ICD-10-CM

## 2020-09-03 PROCEDURE — 73221 MRI JOINT UPR EXTREM W/O DYE: CPT | Mod: TC,LT

## 2020-09-03 PROCEDURE — 73221 MRI JOINT UPR EXTREM W/O DYE: CPT | Mod: 26,LT,, | Performed by: RADIOLOGY

## 2020-09-03 PROCEDURE — 73221 MRI SHOULDER WITHOUT CONTRAST LEFT: ICD-10-PCS | Mod: 26,LT,, | Performed by: RADIOLOGY

## 2020-09-03 NOTE — PROGRESS NOTES
I have  contacted patient to schedule over due diabetic eye exam. Patient stated eye exam was done and that he will send it through his my chart to Dr Shepherd.

## 2020-09-14 NOTE — PROGRESS NOTES
CC: Left shoulder weakness     60 y.o. Male presents as a new patient to me. RHD. Patient was referred from LAUREN Hartman.  He  works as a . Complaint is left shoulder weakness and mild pain with overhead activity.  Patient states he had a fall April 2020 with no issues prior. Pain is mild. Localized over the anterior shoulder and lateral shoulder. Worse with overhead activity. Pain is disruptive to sleep at night. Better with rest. Denies neck pain or radicular symptoms. Treatment thus far has included activity modifications, rest, and oral medication.  Here today to discuss diagnosis and treatment options.     PMHx notable for hypertension, diabetes and diverticulitis colon.   Positive for tobacco.   Positive for diabetes. Last A1C:6.6 on 7/17/2020    Pain Score:   5     PAST MEDICAL HISTORY:   Past Medical History:   Diagnosis Date    Diabetes mellitus type II     Hypertension     Kidney stones     Type II or unspecified type diabetes mellitus without mention of complication, not stated as uncontrolled        PAST SURGICAL HISTORY:  Non contributory    FAMILY HISTORY:  Family History   Problem Relation Age of Onset    Hypertension Mother     Diabetes Mother     Hypertension Father     Diabetes Father        MEDICATIONS:    Current Outpatient Medications:     fluticasone propionate (FLONASE) 50 mcg/actuation nasal spray, SHAKE LIQUID AND USE 2 SPRAYS(100 MCG) IN EACH NOSTRIL EVERY DAY, Disp: 16 mL, Rfl: 11    insulin (LANTUS SOLOSTAR U-100 INSULIN) glargine 100 units/mL (3mL) SubQ pen, INJECT 10-20 UNITS INTO THE SKIN ONCE DAILY., Disp: 45 mL, Rfl: 3    insulin glargine-lixisenatide (SOLIQUA 100/33) 100 unit-33 mcg/mL InPn, Inject 20 to 25 Units into the skin once daily., Disp: 15 mL, Rfl: 11    liraglutide 0.6 mg/0.1 mL, 18 mg/3 mL, subq PNIJ (VICTOZA 3-JULIETTE) 0.6 mg/0.1 mL (18 mg/3 mL) PnIj pen, Inject 1.8 mg into the skin once daily., Disp: 27 mL, Rfl: 3    ofloxacin (OCUFLOX) 0.3 %  "ophthalmic solution, 2 drops into bilateral ears tid prn., Disp: 10 mL, Rfl: 0    pen needle, diabetic (BD ULTRA-FINE MINI PEN NEEDLE) 31 gauge x 3/16" Ndle, USE 3 TIMES PER DAY, Disp: 100 each, Rfl: 10    atorvastatin (LIPITOR) 40 MG tablet, Take 1 tablet (40 mg total) by mouth once daily., Disp: 90 tablet, Rfl: 3    losartan (COZAAR) 100 MG tablet, Take 1 tablet (100 mg total) by mouth once daily. 1 Tablet Oral Every day, Disp: 90 tablet, Rfl: 3    ALLERGIES:  Review of patient's allergies indicates:   Allergen Reactions    Bactrim  [sulfamethoxazole-trimethoprim]      Other reaction(s): Swelling  Other reaction(s): [place]    Clindamycin      Other reaction(s): Swelling    Sulfa (sulfonamide antibiotics)         REVIEW OF SYSTEMS:  Constitution: Negative. Negative for chills, fever and night sweats.    Hematologic/Lymphatic: Negative for bleeding problem. Does not bruise/bleed easily.   Skin: Negative for dry skin, itching and rash.   Musculoskeletal: Negative for falls. Positive for left shoulder pain and muscle weakness.     All other review of symptoms were reviewed and found to be noncontributory.    PHYSICAL EXAMINATION:  Vitals:  BP (!) 148/90   Pulse 80   Wt 98 kg (216 lb)   BMI 30.13 kg/m²    General: Well-developed well-nourished 60 y.o. malein no acute distress   Cardiovascular: Regular rhythm by palpation of distal pulse, normal color and temperature, no concerning varicosities on symptomatic side   Lungs: No labored breathing or wheezing appreciated   Neuro: Alert and oriented ×3   Psychiatric: well oriented to person, place and time, demonstrates normal mood and affect   Skin: No rashes, lesions or ulcers, normal temperature, turgor, and texture on uninvolved extremity    Ortho/SPM Exam  Examination of the left shoulder demonstrates active forward elevation to 165, ER with arm at side to 50. Passive FE to 170, ER to 55. 5 degrees ER lag. Negative Hornblower's. Moderate tenderness " along the proximal biceps tendon with no AC joint tenderness. 3+/5 resisted supraspinatus testing. 3+/5 resisted infraspinatus testing. Negative belly press test. Minimal pain at Codman's point. Positive biceps tension signs. Stable shoulder. No midline neck tenderness. Negative Spurling's maneuver.     IMAGING:  Xrays including AP, Outlet and Axillary Lateral of Left shoulder are ordered / images reviewed by me:   No fracture or acute change appreciated. Mild glenohumeral degenerative changes. Mild to moderate AC joint arthritis. Normal acromiohumeral distance. Significant subacromial spurring.     MRI of Left shoulder reviewed by me and discussed with patient. Study shows:     Impression:     1. Massive rotator cuff tear involving the supraspinatus and infraspinatus tendons with differential retraction to the level of the glenoid and moderate volume loss and mild fatty infiltration.  2. Subscapularis tendinosis with partial-thickness interstitial tearing of the superior fibers.  3. Degenerative fraying of the glenoid labrum with complex tearing of the superior segments.  4. Biceps tendinosis and partial-thickness interstitial tearing.  5. AC joint hypertrophy.  Os acromiale.  Subacromial spurring.  6. Large joint effusion with associated synovitis.    On my read: Teres Minor hypertrophy. IS Goutallier grade II-III changes. Negative [place] sign.    ASSESSMENT:      ICD-10-CM ICD-9-CM   1. Traumatic complete tear of left rotator cuff, initial encounter  S46.012A 840.4   2. Shoulder weakness  R29.898 719.61   3. Left shoulder pain, unspecified chronicity  M25.512 719.41       PLAN:     Findings discussed with the patient.  This is a massive rotator cuff tear with signs of chronicity.  Significant fatty infiltration.  The patient's primary complaint is weakness.  We did discuss potential attempted surgical repair.  I do have concerns regarding potential tissue irreparability versus retear.  The patient really denies  any significant pain in the shoulder at this time.  Strength gains can be unpredictable after rotator cuff repair.  He does have fairly well-maintained active overhead range of motion.  Reverse shoulder arthroplasty not recommended for this at this time.  The patient prefers non operative treatment.  I think that is reasonable given the absence of his pain.  He understands that with continued non operative treatment there will likely be continued secondary muscular degenerative changes.  Referral placed for physical therapy.  Focus on cuff strengthening.  Also discussed oral anti-inflammatory medication.  Return to clinic as needed.      Procedures

## 2020-09-15 ENCOUNTER — OFFICE VISIT (OUTPATIENT)
Dept: SPORTS MEDICINE | Facility: CLINIC | Age: 60
End: 2020-09-15
Payer: COMMERCIAL

## 2020-09-15 ENCOUNTER — HOSPITAL ENCOUNTER (OUTPATIENT)
Dept: RADIOLOGY | Facility: HOSPITAL | Age: 60
Discharge: HOME | End: 2020-09-15
Attending: ORTHOPAEDIC SURGERY
Payer: COMMERCIAL

## 2020-09-15 VITALS
WEIGHT: 216 LBS | SYSTOLIC BLOOD PRESSURE: 148 MMHG | HEART RATE: 80 BPM | DIASTOLIC BLOOD PRESSURE: 90 MMHG | BODY MASS INDEX: 30.13 KG/M2

## 2020-09-15 DIAGNOSIS — M25.512 LEFT SHOULDER PAIN, UNSPECIFIED CHRONICITY: ICD-10-CM

## 2020-09-15 DIAGNOSIS — R29.898 SHOULDER WEAKNESS: ICD-10-CM

## 2020-09-15 DIAGNOSIS — S46.012A TRAUMATIC COMPLETE TEAR OF LEFT ROTATOR CUFF, INITIAL ENCOUNTER: Primary | ICD-10-CM

## 2020-09-15 PROCEDURE — 99204 OFFICE O/P NEW MOD 45 MIN: CPT | Mod: S$GLB,,, | Performed by: ORTHOPAEDIC SURGERY

## 2020-09-15 PROCEDURE — 99999 PR PBB SHADOW E&M-EST. PATIENT-LVL III: CPT | Mod: PBBFAC,,, | Performed by: ORTHOPAEDIC SURGERY

## 2020-09-15 PROCEDURE — 73030 X-RAY EXAM OF SHOULDER: CPT | Mod: TC,LT

## 2020-09-15 PROCEDURE — 99204 PR OFFICE/OUTPT VISIT, NEW, LEVL IV, 45-59 MIN: ICD-10-PCS | Mod: S$GLB,,, | Performed by: ORTHOPAEDIC SURGERY

## 2020-09-15 PROCEDURE — 99999 PR PBB SHADOW E&M-EST. PATIENT-LVL III: ICD-10-PCS | Mod: PBBFAC,,, | Performed by: ORTHOPAEDIC SURGERY

## 2020-09-15 PROCEDURE — 73030 XR SHOULDER COMPLETE 2 OR MORE VIEWS LEFT: ICD-10-PCS | Mod: 26,LT,, | Performed by: RADIOLOGY

## 2020-09-15 PROCEDURE — 73030 X-RAY EXAM OF SHOULDER: CPT | Mod: 26,LT,, | Performed by: RADIOLOGY

## 2020-09-15 NOTE — LETTER
October 6, 2020      Dennis Varela PA-C  [address]  [facility] [address]           [facility]  [address]  [facility] [address]  Phone: [phone]          Patient: Epifanio Godoy   MR Number: [id number]   YOB: 1960   Date of Visit: 9/15/2020       Dear Dennis Varela:    Thank you for referring Epifanio Godoy to me for evaluation. Attached you will find relevant portions of my assessment and plan of care.    If you have questions, please do not hesitate to call me. I look forward to following Epifanio Godoy along with you.    Sincerely,    CRISTINA Waddell MD    Enclosure  CC:  No Recipients    If you would like to receive this communication electronically, please contact [email or web address] or [phone] to request more information on [employer] Link access.    For providers and/or their staff who would like to refer a patient to [facility], please contact us through our one-stop-shop provider referral line, [facility], at [phone].    If you feel you have received this communication in error or would no longer like to receive these types of communications, please e-mail [email or web address]

## 2020-09-21 ENCOUNTER — PATIENT MESSAGE (OUTPATIENT)
Dept: PRIMARY CARE CLINIC | Facility: CLINIC | Age: 60
End: 2020-09-21

## 2020-09-21 DIAGNOSIS — Z79.4 TYPE 2 DIABETES MELLITUS WITHOUT COMPLICATION, WITH LONG-TERM CURRENT USE OF INSULIN: ICD-10-CM

## 2020-09-21 DIAGNOSIS — E11.69 HYPERLIPIDEMIA ASSOCIATED WITH TYPE 2 DIABETES MELLITUS: ICD-10-CM

## 2020-09-21 DIAGNOSIS — E78.5 HYPERLIPIDEMIA ASSOCIATED WITH TYPE 2 DIABETES MELLITUS: ICD-10-CM

## 2020-09-21 DIAGNOSIS — E11.9 TYPE 2 DIABETES MELLITUS WITHOUT COMPLICATION, WITH LONG-TERM CURRENT USE OF INSULIN: ICD-10-CM

## 2020-09-21 RX ORDER — ATORVASTATIN CALCIUM 40 MG/1
40 TABLET, FILM COATED ORAL DAILY
Qty: 90 TABLET | Refills: 3 | Status: SHIPPED | OUTPATIENT
Start: 2020-09-21 | End: 2020-09-29 | Stop reason: SDUPTHER

## 2020-09-21 RX ORDER — LOSARTAN POTASSIUM 100 MG/1
100 TABLET ORAL DAILY
Qty: 90 TABLET | Refills: 3 | Status: SHIPPED | OUTPATIENT
Start: 2020-09-21 | End: 2020-09-29 | Stop reason: SDUPTHER

## 2020-09-24 ENCOUNTER — PATIENT MESSAGE (OUTPATIENT)
Dept: PRIMARY CARE CLINIC | Facility: CLINIC | Age: 60
End: 2020-09-24

## 2020-09-29 ENCOUNTER — PATIENT MESSAGE (OUTPATIENT)
Dept: PRIMARY CARE CLINIC | Facility: CLINIC | Age: 60
End: 2020-09-29

## 2020-09-29 DIAGNOSIS — E11.69 HYPERLIPIDEMIA ASSOCIATED WITH TYPE 2 DIABETES MELLITUS: ICD-10-CM

## 2020-09-29 DIAGNOSIS — E78.5 HYPERLIPIDEMIA ASSOCIATED WITH TYPE 2 DIABETES MELLITUS: ICD-10-CM

## 2020-09-29 DIAGNOSIS — E11.9 TYPE 2 DIABETES MELLITUS WITHOUT COMPLICATION, WITH LONG-TERM CURRENT USE OF INSULIN: ICD-10-CM

## 2020-09-29 DIAGNOSIS — Z79.4 TYPE 2 DIABETES MELLITUS WITHOUT COMPLICATION, WITH LONG-TERM CURRENT USE OF INSULIN: ICD-10-CM

## 2020-09-29 RX ORDER — LOSARTAN POTASSIUM 100 MG/1
100 TABLET ORAL DAILY
Qty: 90 TABLET | Refills: 3 | Status: SHIPPED | OUTPATIENT
Start: 2020-09-29 | End: 2021-01-25 | Stop reason: SDUPTHER

## 2020-09-29 RX ORDER — ATORVASTATIN CALCIUM 40 MG/1
40 TABLET, FILM COATED ORAL DAILY
Qty: 90 TABLET | Refills: 3 | Status: SHIPPED | OUTPATIENT
Start: 2020-09-29 | End: 2021-01-25 | Stop reason: SDUPTHER

## 2020-10-26 ENCOUNTER — PATIENT MESSAGE (OUTPATIENT)
Dept: PRIMARY CARE CLINIC | Facility: CLINIC | Age: 60
End: 2020-10-26

## 2021-01-04 DIAGNOSIS — Z79.4 TYPE 2 DIABETES MELLITUS WITHOUT COMPLICATION, WITH LONG-TERM CURRENT USE OF INSULIN: ICD-10-CM

## 2021-01-04 DIAGNOSIS — E11.9 TYPE 2 DIABETES MELLITUS WITHOUT COMPLICATION, WITH LONG-TERM CURRENT USE OF INSULIN: ICD-10-CM

## 2021-01-04 RX ORDER — INSULIN GLARGINE 100 [IU]/ML
INJECTION, SOLUTION SUBCUTANEOUS
Qty: 45 ML | Refills: 3 | Status: SHIPPED | OUTPATIENT
Start: 2021-01-04 | End: 2021-01-05 | Stop reason: SDUPTHER

## 2021-01-05 DIAGNOSIS — Z79.4 TYPE 2 DIABETES MELLITUS WITHOUT COMPLICATION, WITH LONG-TERM CURRENT USE OF INSULIN: ICD-10-CM

## 2021-01-05 DIAGNOSIS — E11.9 TYPE 2 DIABETES MELLITUS WITHOUT COMPLICATION, WITH LONG-TERM CURRENT USE OF INSULIN: ICD-10-CM

## 2021-01-05 RX ORDER — PEN NEEDLE, DIABETIC 30 GX3/16"
NEEDLE, DISPOSABLE MISCELLANEOUS
Qty: 100 EACH | Refills: 10 | Status: SHIPPED | OUTPATIENT
Start: 2021-01-05 | End: 2022-05-16 | Stop reason: SDUPTHER

## 2021-01-05 RX ORDER — INSULIN GLARGINE 100 [IU]/ML
INJECTION, SOLUTION SUBCUTANEOUS
Qty: 45 ML | Refills: 3 | Status: SHIPPED | OUTPATIENT
Start: 2021-01-05 | End: 2021-01-25

## 2021-01-25 ENCOUNTER — HOSPITAL ENCOUNTER (OUTPATIENT)
Dept: RADIOLOGY | Facility: HOSPITAL | Age: 61
Discharge: HOME | End: 2021-01-25
Attending: FAMILY MEDICINE
Payer: COMMERCIAL

## 2021-01-25 ENCOUNTER — OFFICE VISIT (OUTPATIENT)
Dept: PRIMARY CARE CLINIC | Facility: CLINIC | Age: 61
End: 2021-01-25
Payer: COMMERCIAL

## 2021-01-25 VITALS
OXYGEN SATURATION: 99 % | WEIGHT: 216.38 LBS | HEART RATE: 78 BPM | SYSTOLIC BLOOD PRESSURE: 160 MMHG | DIASTOLIC BLOOD PRESSURE: 90 MMHG | BODY MASS INDEX: 30.18 KG/M2 | TEMPERATURE: 98 F

## 2021-01-25 DIAGNOSIS — M54.32 BILATERAL SCIATICA: ICD-10-CM

## 2021-01-25 DIAGNOSIS — E11.9 TYPE 2 DIABETES MELLITUS WITHOUT COMPLICATION, WITH LONG-TERM CURRENT USE OF INSULIN: ICD-10-CM

## 2021-01-25 DIAGNOSIS — M54.31 BILATERAL SCIATICA: ICD-10-CM

## 2021-01-25 DIAGNOSIS — M47.817 SPONDYLOSIS WITHOUT MYELOPATHY OR RADICULOPATHY, LUMBOSACRAL REGION: ICD-10-CM

## 2021-01-25 DIAGNOSIS — R53.83 FATIGUE, UNSPECIFIED TYPE: ICD-10-CM

## 2021-01-25 DIAGNOSIS — M47.817 FACET ARTHRITIS, DEGENERATIVE, L5-S1 LEVEL, LUMBOSACRAL SPINE: ICD-10-CM

## 2021-01-25 DIAGNOSIS — M54.9 DORSALGIA, UNSPECIFIED: ICD-10-CM

## 2021-01-25 DIAGNOSIS — Z72.0 TOBACCO ABUSE: ICD-10-CM

## 2021-01-25 DIAGNOSIS — M51.36 LUMBAR DEGENERATIVE DISC DISEASE: ICD-10-CM

## 2021-01-25 DIAGNOSIS — I10 ESSENTIAL HYPERTENSION: ICD-10-CM

## 2021-01-25 DIAGNOSIS — Z79.4 TYPE 2 DIABETES MELLITUS WITHOUT COMPLICATION, WITH LONG-TERM CURRENT USE OF INSULIN: ICD-10-CM

## 2021-01-25 DIAGNOSIS — E78.5 HYPERLIPIDEMIA ASSOCIATED WITH TYPE 2 DIABETES MELLITUS: Primary | ICD-10-CM

## 2021-01-25 DIAGNOSIS — E11.69 HYPERLIPIDEMIA ASSOCIATED WITH TYPE 2 DIABETES MELLITUS: Primary | ICD-10-CM

## 2021-01-25 DIAGNOSIS — R68.82 DECREASED SEX DRIVE: ICD-10-CM

## 2021-01-25 PROCEDURE — 90471 IMMUNIZATION ADMIN: CPT | Mod: S$GLB,,, | Performed by: FAMILY MEDICINE

## 2021-01-25 PROCEDURE — 72100 X-RAY EXAM L-S SPINE 2/3 VWS: CPT | Mod: TC,FY,PO

## 2021-01-25 PROCEDURE — 1126F PR PAIN SEVERITY QUANTIFIED, NO PAIN PRESENT: ICD-10-PCS | Mod: S$GLB,,, | Performed by: FAMILY MEDICINE

## 2021-01-25 PROCEDURE — 3008F BODY MASS INDEX DOCD: CPT | Mod: CPTII,S$GLB,, | Performed by: FAMILY MEDICINE

## 2021-01-25 PROCEDURE — 1126F AMNT PAIN NOTED NONE PRSNT: CPT | Mod: S$GLB,,, | Performed by: FAMILY MEDICINE

## 2021-01-25 PROCEDURE — 99215 PR OFFICE/OUTPT VISIT, EST, LEVL V, 40-54 MIN: ICD-10-PCS | Mod: 25,S$GLB,, | Performed by: FAMILY MEDICINE

## 2021-01-25 PROCEDURE — 90471 FLU VACCINE (QUAD) GREATER THAN OR EQUAL TO 3YO PRESERVATIVE FREE IM: ICD-10-PCS | Mod: S$GLB,,, | Performed by: FAMILY MEDICINE

## 2021-01-25 PROCEDURE — 99999 PR PBB SHADOW E&M-EST. PATIENT-LVL V: CPT | Mod: PBBFAC,,, | Performed by: FAMILY MEDICINE

## 2021-01-25 PROCEDURE — 90750 HZV VACC RECOMBINANT IM: CPT | Mod: S$GLB,,, | Performed by: FAMILY MEDICINE

## 2021-01-25 PROCEDURE — 3080F PR MOST RECENT DIASTOLIC BLOOD PRESSURE >= 90 MM HG: ICD-10-PCS | Mod: CPTII,S$GLB,, | Performed by: FAMILY MEDICINE

## 2021-01-25 PROCEDURE — 3044F HG A1C LEVEL LT 7.0%: CPT | Mod: CPTII,S$GLB,, | Performed by: FAMILY MEDICINE

## 2021-01-25 PROCEDURE — 3044F PR MOST RECENT HEMOGLOBIN A1C LEVEL <7.0%: ICD-10-PCS | Mod: CPTII,S$GLB,, | Performed by: FAMILY MEDICINE

## 2021-01-25 PROCEDURE — 3080F DIAST BP >= 90 MM HG: CPT | Mod: CPTII,S$GLB,, | Performed by: FAMILY MEDICINE

## 2021-01-25 PROCEDURE — 99215 OFFICE O/P EST HI 40 MIN: CPT | Mod: 25,S$GLB,, | Performed by: FAMILY MEDICINE

## 2021-01-25 PROCEDURE — 99999 PR PBB SHADOW E&M-EST. PATIENT-LVL V: ICD-10-PCS | Mod: PBBFAC,,, | Performed by: FAMILY MEDICINE

## 2021-01-25 PROCEDURE — 3077F SYST BP >= 140 MM HG: CPT | Mod: CPTII,S$GLB,, | Performed by: FAMILY MEDICINE

## 2021-01-25 PROCEDURE — 90686 FLU VACCINE (QUAD) GREATER THAN OR EQUAL TO 3YO PRESERVATIVE FREE IM: ICD-10-PCS | Mod: S$GLB,,, | Performed by: FAMILY MEDICINE

## 2021-01-25 PROCEDURE — 72100 X-RAY EXAM L-S SPINE 2/3 VWS: CPT | Mod: 26,,, | Performed by: RADIOLOGY

## 2021-01-25 PROCEDURE — 3077F PR MOST RECENT SYSTOLIC BLOOD PRESSURE >= 140 MM HG: ICD-10-PCS | Mod: CPTII,S$GLB,, | Performed by: FAMILY MEDICINE

## 2021-01-25 PROCEDURE — 90750 ZOSTER RECOMBINANT VACCINE: ICD-10-PCS | Mod: S$GLB,,, | Performed by: FAMILY MEDICINE

## 2021-01-25 PROCEDURE — 72100 XR LUMBAR SPINE 2 OR 3 VIEWS: ICD-10-PCS | Mod: 26,,, | Performed by: RADIOLOGY

## 2021-01-25 PROCEDURE — 90472 IMMUNIZATION ADMIN EACH ADD: CPT | Mod: S$GLB,,, | Performed by: FAMILY MEDICINE

## 2021-01-25 PROCEDURE — 90686 IIV4 VACC NO PRSV 0.5 ML IM: CPT | Mod: S$GLB,,, | Performed by: FAMILY MEDICINE

## 2021-01-25 PROCEDURE — 3008F PR BODY MASS INDEX (BMI) DOCUMENTED: ICD-10-PCS | Mod: CPTII,S$GLB,, | Performed by: FAMILY MEDICINE

## 2021-01-25 PROCEDURE — 90472 ZOSTER RECOMBINANT VACCINE: ICD-10-PCS | Mod: S$GLB,,, | Performed by: FAMILY MEDICINE

## 2021-01-25 RX ORDER — INSULIN GLARGINE 100 [IU]/ML
INJECTION, SOLUTION SUBCUTANEOUS
Qty: 45 ML | Refills: 3
Start: 2021-01-25 | End: 2021-01-25 | Stop reason: SDUPTHER

## 2021-01-25 RX ORDER — LOSARTAN POTASSIUM 100 MG/1
100 TABLET ORAL DAILY
Qty: 90 TABLET | Refills: 3 | Status: SHIPPED | OUTPATIENT
Start: 2021-01-25 | End: 2021-04-14 | Stop reason: SDUPTHER

## 2021-01-25 RX ORDER — LIRAGLUTIDE 6 MG/ML
1.8 INJECTION SUBCUTANEOUS DAILY
Qty: 27 ML | Refills: 3 | Status: SHIPPED | OUTPATIENT
Start: 2021-01-25 | End: 2021-03-09 | Stop reason: SDUPTHER

## 2021-01-25 RX ORDER — INSULIN GLARGINE 100 [IU]/ML
INJECTION, SOLUTION SUBCUTANEOUS
Qty: 45 ML | Refills: 3
Start: 2021-01-25 | End: 2021-03-09 | Stop reason: SDUPTHER

## 2021-01-25 RX ORDER — ATORVASTATIN CALCIUM 40 MG/1
40 TABLET, FILM COATED ORAL DAILY
Qty: 90 TABLET | Refills: 3 | Status: SHIPPED | OUTPATIENT
Start: 2021-01-25 | End: 2021-04-14 | Stop reason: SDUPTHER

## 2021-01-26 ENCOUNTER — PATIENT MESSAGE (OUTPATIENT)
Dept: PRIMARY CARE CLINIC | Facility: CLINIC | Age: 61
End: 2021-01-26

## 2021-02-01 ENCOUNTER — TELEPHONE (OUTPATIENT)
Dept: PRIMARY CARE CLINIC | Facility: CLINIC | Age: 61
End: 2021-02-01

## 2021-02-01 ENCOUNTER — PATIENT OUTREACH (OUTPATIENT)
Dept: ADMINISTRATIVE | Facility: HOSPITAL | Age: 61
End: 2021-02-01

## 2021-03-04 ENCOUNTER — PATIENT MESSAGE (OUTPATIENT)
Dept: PRIMARY CARE CLINIC | Facility: CLINIC | Age: 61
End: 2021-03-04

## 2021-03-04 DIAGNOSIS — Z79.4 TYPE 2 DIABETES MELLITUS WITHOUT COMPLICATION, WITH LONG-TERM CURRENT USE OF INSULIN: ICD-10-CM

## 2021-03-04 DIAGNOSIS — E11.9 TYPE 2 DIABETES MELLITUS WITHOUT COMPLICATION, WITH LONG-TERM CURRENT USE OF INSULIN: ICD-10-CM

## 2021-03-06 ENCOUNTER — PATIENT MESSAGE (OUTPATIENT)
Dept: PRIMARY CARE CLINIC | Facility: CLINIC | Age: 61
End: 2021-03-06

## 2021-03-09 RX ORDER — LIRAGLUTIDE 6 MG/ML
1.8 INJECTION SUBCUTANEOUS DAILY
Qty: 27 ML | Refills: 3 | Status: SHIPPED | OUTPATIENT
Start: 2021-03-09 | End: 2022-05-16

## 2021-03-09 RX ORDER — INSULIN GLARGINE 100 [IU]/ML
INJECTION, SOLUTION SUBCUTANEOUS
Qty: 45 ML | Refills: 3
Start: 2021-03-09 | End: 2021-05-05 | Stop reason: SDUPTHER

## 2021-03-30 ENCOUNTER — TELEPHONE (OUTPATIENT)
Dept: UROLOGY | Facility: CLINIC | Age: 61
End: 2021-03-30

## 2021-04-01 ENCOUNTER — LAB VISIT (OUTPATIENT)
Dept: LAB | Facility: HOSPITAL | Age: 61
End: 2021-04-01
Attending: FAMILY MEDICINE
Payer: COMMERCIAL

## 2021-04-01 ENCOUNTER — OFFICE VISIT (OUTPATIENT)
Dept: PRIMARY CARE CLINIC | Facility: CLINIC | Age: 61
End: 2021-04-01
Payer: COMMERCIAL

## 2021-04-01 VITALS
WEIGHT: 219.69 LBS | HEART RATE: 98 BPM | HEIGHT: 71 IN | SYSTOLIC BLOOD PRESSURE: 136 MMHG | DIASTOLIC BLOOD PRESSURE: 76 MMHG | TEMPERATURE: 98 F | BODY MASS INDEX: 30.76 KG/M2

## 2021-04-01 DIAGNOSIS — Z79.4 TYPE 2 DIABETES MELLITUS WITH DIABETIC DERMATITIS, WITH LONG-TERM CURRENT USE OF INSULIN: ICD-10-CM

## 2021-04-01 DIAGNOSIS — E78.5 HYPERLIPIDEMIA ASSOCIATED WITH TYPE 2 DIABETES MELLITUS: ICD-10-CM

## 2021-04-01 DIAGNOSIS — E11.620 TYPE 2 DIABETES MELLITUS WITH DIABETIC DERMATITIS, WITH LONG-TERM CURRENT USE OF INSULIN: ICD-10-CM

## 2021-04-01 DIAGNOSIS — Z72.0 TOBACCO ABUSE: ICD-10-CM

## 2021-04-01 DIAGNOSIS — M47.817 FACET ARTHRITIS, DEGENERATIVE, L5-S1 LEVEL, LUMBOSACRAL SPINE: ICD-10-CM

## 2021-04-01 DIAGNOSIS — E11.69 HYPERLIPIDEMIA ASSOCIATED WITH TYPE 2 DIABETES MELLITUS: ICD-10-CM

## 2021-04-01 DIAGNOSIS — M51.36 LUMBAR DEGENERATIVE DISC DISEASE: ICD-10-CM

## 2021-04-01 DIAGNOSIS — H61.23 BILATERAL IMPACTED CERUMEN: ICD-10-CM

## 2021-04-01 DIAGNOSIS — I15.2 HYPERTENSION ASSOCIATED WITH DIABETES: ICD-10-CM

## 2021-04-01 DIAGNOSIS — I10 ESSENTIAL HYPERTENSION: ICD-10-CM

## 2021-04-01 DIAGNOSIS — E11.620 TYPE 2 DIABETES MELLITUS WITH DIABETIC DERMATITIS, WITH LONG-TERM CURRENT USE OF INSULIN: Primary | ICD-10-CM

## 2021-04-01 DIAGNOSIS — Z79.4 TYPE 2 DIABETES MELLITUS WITH DIABETIC DERMATITIS, WITH LONG-TERM CURRENT USE OF INSULIN: Primary | ICD-10-CM

## 2021-04-01 DIAGNOSIS — N52.9 ERECTILE DYSFUNCTION, UNSPECIFIED ERECTILE DYSFUNCTION TYPE: ICD-10-CM

## 2021-04-01 DIAGNOSIS — E11.59 HYPERTENSION ASSOCIATED WITH DIABETES: ICD-10-CM

## 2021-04-01 LAB
ALBUMIN SERPL BCP-MCNC: 3.8 G/DL (ref 3.5–5.2)
ALP SERPL-CCNC: 73 U/L (ref 55–135)
ALT SERPL W/O P-5'-P-CCNC: 34 U/L (ref 10–44)
ANION GAP SERPL CALC-SCNC: 8 MMOL/L (ref 8–16)
AST SERPL-CCNC: 22 U/L (ref 10–40)
BASOPHILS # BLD AUTO: 0.02 K/UL (ref 0–0.2)
BASOPHILS NFR BLD: 0.4 % (ref 0–1.9)
BILIRUB SERPL-MCNC: 0.2 MG/DL (ref 0.1–1)
BUN SERPL-MCNC: 12 MG/DL (ref 6–20)
CALCIUM SERPL-MCNC: 9.4 MG/DL (ref 8.7–10.5)
CHLORIDE SERPL-SCNC: 108 MMOL/L (ref 95–110)
CHOLEST SERPL-MCNC: 209 MG/DL (ref 120–199)
CHOLEST/HDLC SERPL: 4.6 {RATIO} (ref 2–5)
CO2 SERPL-SCNC: 27 MMOL/L (ref 23–29)
CREAT SERPL-MCNC: 1.1 MG/DL (ref 0.5–1.4)
DIFFERENTIAL METHOD: ABNORMAL
EOSINOPHIL # BLD AUTO: 0.2 K/UL (ref 0–0.5)
EOSINOPHIL NFR BLD: 2.8 % (ref 0–8)
ERYTHROCYTE [DISTWIDTH] IN BLOOD BY AUTOMATED COUNT: 17.1 % (ref 11.5–14.5)
EST. GFR  (AFRICAN AMERICAN): >60 ML/MIN/1.73 M^2
EST. GFR  (NON AFRICAN AMERICAN): >60 ML/MIN/1.73 M^2
GLUCOSE SERPL-MCNC: 123 MG/DL (ref 70–110)
HCT VFR BLD AUTO: 40.9 % (ref 40–54)
HDLC SERPL-MCNC: 45 MG/DL (ref 40–75)
HDLC SERPL: 21.5 % (ref 20–50)
HGB BLD-MCNC: 12.6 G/DL (ref 14–18)
IMM GRANULOCYTES # BLD AUTO: 0.01 K/UL (ref 0–0.04)
IMM GRANULOCYTES NFR BLD AUTO: 0.2 % (ref 0–0.5)
LDLC SERPL CALC-MCNC: 109.8 MG/DL (ref 63–159)
LYMPHOCYTES # BLD AUTO: 1.9 K/UL (ref 1–4.8)
LYMPHOCYTES NFR BLD: 35.2 % (ref 18–48)
MCH RBC QN AUTO: 26.8 PG (ref 27–31)
MCHC RBC AUTO-ENTMCNC: 30.8 G/DL (ref 32–36)
MCV RBC AUTO: 87 FL (ref 82–98)
MONOCYTES # BLD AUTO: 0.5 K/UL (ref 0.3–1)
MONOCYTES NFR BLD: 10 % (ref 4–15)
NEUTROPHILS # BLD AUTO: 2.7 K/UL (ref 1.8–7.7)
NEUTROPHILS NFR BLD: 51.4 % (ref 38–73)
NONHDLC SERPL-MCNC: 164 MG/DL
NRBC BLD-RTO: 0 /100 WBC
PLATELET # BLD AUTO: 333 K/UL (ref 150–450)
PMV BLD AUTO: 9.8 FL (ref 9.2–12.9)
POTASSIUM SERPL-SCNC: 4.5 MMOL/L (ref 3.5–5.1)
PROT SERPL-MCNC: 7.6 G/DL (ref 6–8.4)
RBC # BLD AUTO: 4.71 M/UL (ref 4.6–6.2)
SODIUM SERPL-SCNC: 143 MMOL/L (ref 136–145)
TRIGL SERPL-MCNC: 271 MG/DL (ref 30–150)
WBC # BLD AUTO: 5.28 K/UL (ref 3.9–12.7)

## 2021-04-01 PROCEDURE — 99999 PR PBB SHADOW E&M-EST. PATIENT-LVL V: CPT | Mod: PBBFAC,,, | Performed by: FAMILY MEDICINE

## 2021-04-01 PROCEDURE — 99214 PR OFFICE/OUTPT VISIT, EST, LEVL IV, 30-39 MIN: ICD-10-PCS | Mod: S$GLB,,, | Performed by: FAMILY MEDICINE

## 2021-04-01 PROCEDURE — 99214 OFFICE O/P EST MOD 30 MIN: CPT | Mod: S$GLB,,, | Performed by: FAMILY MEDICINE

## 2021-04-01 PROCEDURE — 80061 LIPID PANEL: CPT | Performed by: FAMILY MEDICINE

## 2021-04-01 PROCEDURE — 3044F PR MOST RECENT HEMOGLOBIN A1C LEVEL <7.0%: ICD-10-PCS | Mod: CPTII,S$GLB,, | Performed by: FAMILY MEDICINE

## 2021-04-01 PROCEDURE — 83036 HEMOGLOBIN GLYCOSYLATED A1C: CPT | Performed by: FAMILY MEDICINE

## 2021-04-01 PROCEDURE — 3008F BODY MASS INDEX DOCD: CPT | Mod: CPTII,S$GLB,, | Performed by: FAMILY MEDICINE

## 2021-04-01 PROCEDURE — 99999 PR PBB SHADOW E&M-EST. PATIENT-LVL V: ICD-10-PCS | Mod: PBBFAC,,, | Performed by: FAMILY MEDICINE

## 2021-04-01 PROCEDURE — 36415 COLL VENOUS BLD VENIPUNCTURE: CPT | Mod: PN | Performed by: FAMILY MEDICINE

## 2021-04-01 PROCEDURE — 3044F HG A1C LEVEL LT 7.0%: CPT | Mod: CPTII,S$GLB,, | Performed by: FAMILY MEDICINE

## 2021-04-01 PROCEDURE — 85025 COMPLETE CBC W/AUTO DIFF WBC: CPT | Performed by: FAMILY MEDICINE

## 2021-04-01 PROCEDURE — 1126F PR PAIN SEVERITY QUANTIFIED, NO PAIN PRESENT: ICD-10-PCS | Mod: S$GLB,,, | Performed by: FAMILY MEDICINE

## 2021-04-01 PROCEDURE — 1126F AMNT PAIN NOTED NONE PRSNT: CPT | Mod: S$GLB,,, | Performed by: FAMILY MEDICINE

## 2021-04-01 PROCEDURE — 3008F PR BODY MASS INDEX (BMI) DOCUMENTED: ICD-10-PCS | Mod: CPTII,S$GLB,, | Performed by: FAMILY MEDICINE

## 2021-04-01 PROCEDURE — 80053 COMPREHEN METABOLIC PANEL: CPT | Performed by: FAMILY MEDICINE

## 2021-04-02 LAB
ESTIMATED AVG GLUCOSE: 151 MG/DL (ref 68–131)
HBA1C MFR BLD: 6.9 % (ref 4–5.6)

## 2021-04-06 ENCOUNTER — IMMUNIZATION (OUTPATIENT)
Dept: PRIMARY CARE CLINIC | Facility: CLINIC | Age: 61
End: 2021-04-06
Payer: COMMERCIAL

## 2021-04-06 DIAGNOSIS — Z23 NEED FOR VACCINATION: Primary | ICD-10-CM

## 2021-04-06 PROCEDURE — 91300 COVID-19, MRNA, LNP-S, PF, 30 MCG/0.3 ML DOSE VACCINE: ICD-10-PCS | Mod: S$GLB,,, | Performed by: FAMILY MEDICINE

## 2021-04-06 PROCEDURE — 0001A COVID-19, MRNA, LNP-S, PF, 30 MCG/0.3 ML DOSE VACCINE: CPT | Mod: CV19,S$GLB,, | Performed by: FAMILY MEDICINE

## 2021-04-06 PROCEDURE — 0001A COVID-19, MRNA, LNP-S, PF, 30 MCG/0.3 ML DOSE VACCINE: ICD-10-PCS | Mod: CV19,S$GLB,, | Performed by: FAMILY MEDICINE

## 2021-04-06 PROCEDURE — 91300 COVID-19, MRNA, LNP-S, PF, 30 MCG/0.3 ML DOSE VACCINE: CPT | Mod: S$GLB,,, | Performed by: FAMILY MEDICINE

## 2021-04-07 ENCOUNTER — PATIENT OUTREACH (OUTPATIENT)
Dept: ADMINISTRATIVE | Facility: OTHER | Age: 61
End: 2021-04-07

## 2021-04-14 ENCOUNTER — OFFICE VISIT (OUTPATIENT)
Dept: OTOLARYNGOLOGY | Facility: CLINIC | Age: 61
End: 2021-04-14
Payer: COMMERCIAL

## 2021-04-14 ENCOUNTER — PATIENT MESSAGE (OUTPATIENT)
Dept: PRIMARY CARE CLINIC | Facility: CLINIC | Age: 61
End: 2021-04-14

## 2021-04-14 ENCOUNTER — CLINICAL SUPPORT (OUTPATIENT)
Dept: AUDIOLOGY | Facility: CLINIC | Age: 61
End: 2021-04-14
Payer: COMMERCIAL

## 2021-04-14 VITALS — TEMPERATURE: 98 F | WEIGHT: 218.94 LBS | BODY MASS INDEX: 30.53 KG/M2

## 2021-04-14 DIAGNOSIS — E11.69 HYPERLIPIDEMIA ASSOCIATED WITH TYPE 2 DIABETES MELLITUS: ICD-10-CM

## 2021-04-14 DIAGNOSIS — E78.5 HYPERLIPIDEMIA ASSOCIATED WITH TYPE 2 DIABETES MELLITUS: ICD-10-CM

## 2021-04-14 DIAGNOSIS — H90.0 CONDUCTIVE HEARING LOSS, BILATERAL: ICD-10-CM

## 2021-04-14 DIAGNOSIS — E11.9 TYPE 2 DIABETES MELLITUS WITHOUT COMPLICATION, WITH LONG-TERM CURRENT USE OF INSULIN: ICD-10-CM

## 2021-04-14 DIAGNOSIS — Z79.4 TYPE 2 DIABETES MELLITUS WITHOUT COMPLICATION, WITH LONG-TERM CURRENT USE OF INSULIN: ICD-10-CM

## 2021-04-14 DIAGNOSIS — H61.23 BILATERAL IMPACTED CERUMEN: ICD-10-CM

## 2021-04-14 DIAGNOSIS — H90.0 CONDUCTIVE HEARING LOSS, BILATERAL: Primary | ICD-10-CM

## 2021-04-14 PROCEDURE — 99214 PR OFFICE/OUTPT VISIT, EST, LEVL IV, 30-39 MIN: ICD-10-PCS | Mod: S$GLB,,, | Performed by: ORTHOPAEDIC SURGERY

## 2021-04-14 PROCEDURE — 99999 PR PBB SHADOW E&M-EST. PATIENT-LVL III: CPT | Mod: PBBFAC,,, | Performed by: ORTHOPAEDIC SURGERY

## 2021-04-14 PROCEDURE — 3008F PR BODY MASS INDEX (BMI) DOCUMENTED: ICD-10-PCS | Mod: CPTII,S$GLB,, | Performed by: ORTHOPAEDIC SURGERY

## 2021-04-14 PROCEDURE — 92567 TYMPANOMETRY: CPT | Mod: S$GLB,,, | Performed by: AUDIOLOGIST-HEARING AID FITTER

## 2021-04-14 PROCEDURE — 92557 PR COMPREHENSIVE HEARING TEST: ICD-10-PCS | Mod: S$GLB,,, | Performed by: AUDIOLOGIST-HEARING AID FITTER

## 2021-04-14 PROCEDURE — 3008F BODY MASS INDEX DOCD: CPT | Mod: CPTII,S$GLB,, | Performed by: ORTHOPAEDIC SURGERY

## 2021-04-14 PROCEDURE — 92557 COMPREHENSIVE HEARING TEST: CPT | Mod: S$GLB,,, | Performed by: AUDIOLOGIST-HEARING AID FITTER

## 2021-04-14 PROCEDURE — 99214 OFFICE O/P EST MOD 30 MIN: CPT | Mod: S$GLB,,, | Performed by: ORTHOPAEDIC SURGERY

## 2021-04-14 PROCEDURE — 92567 PR TYMPA2METRY: ICD-10-PCS | Mod: S$GLB,,, | Performed by: AUDIOLOGIST-HEARING AID FITTER

## 2021-04-14 PROCEDURE — 99999 PR PBB SHADOW E&M-EST. PATIENT-LVL III: ICD-10-PCS | Mod: PBBFAC,,, | Performed by: ORTHOPAEDIC SURGERY

## 2021-04-14 RX ORDER — ATORVASTATIN CALCIUM 40 MG/1
40 TABLET, FILM COATED ORAL DAILY
Qty: 90 TABLET | Refills: 3 | Status: SHIPPED | OUTPATIENT
Start: 2021-04-14 | End: 2022-05-16 | Stop reason: SDUPTHER

## 2021-04-14 RX ORDER — ATORVASTATIN CALCIUM 40 MG/1
40 TABLET, FILM COATED ORAL DAILY
Qty: 90 TABLET | Refills: 3 | Status: SHIPPED | OUTPATIENT
Start: 2021-04-14 | End: 2021-04-14 | Stop reason: SDUPTHER

## 2021-04-14 RX ORDER — LOSARTAN POTASSIUM 100 MG/1
100 TABLET ORAL DAILY
Qty: 90 TABLET | Refills: 3 | Status: SHIPPED | OUTPATIENT
Start: 2021-04-14 | End: 2021-04-14 | Stop reason: SDUPTHER

## 2021-04-14 RX ORDER — LOSARTAN POTASSIUM 100 MG/1
100 TABLET ORAL DAILY
Qty: 90 TABLET | Refills: 3 | Status: SHIPPED | OUTPATIENT
Start: 2021-04-14 | End: 2021-05-05 | Stop reason: SDUPTHER

## 2021-04-19 ENCOUNTER — TELEPHONE (OUTPATIENT)
Dept: SMOKING CESSATION | Facility: CLINIC | Age: 61
End: 2021-04-19

## 2021-04-27 ENCOUNTER — IMMUNIZATION (OUTPATIENT)
Dept: PRIMARY CARE CLINIC | Facility: CLINIC | Age: 61
End: 2021-04-27
Payer: COMMERCIAL

## 2021-04-27 DIAGNOSIS — Z23 NEED FOR VACCINATION: Primary | ICD-10-CM

## 2021-04-27 PROCEDURE — 0002A COVID-19, MRNA, LNP-S, PF, 30 MCG/0.3 ML DOSE VACCINE: CPT | Mod: CV19,S$GLB,, | Performed by: FAMILY MEDICINE

## 2021-04-27 PROCEDURE — 91300 COVID-19, MRNA, LNP-S, PF, 30 MCG/0.3 ML DOSE VACCINE: ICD-10-PCS | Mod: S$GLB,,, | Performed by: FAMILY MEDICINE

## 2021-04-27 PROCEDURE — 91300 COVID-19, MRNA, LNP-S, PF, 30 MCG/0.3 ML DOSE VACCINE: CPT | Mod: S$GLB,,, | Performed by: FAMILY MEDICINE

## 2021-04-27 PROCEDURE — 0002A COVID-19, MRNA, LNP-S, PF, 30 MCG/0.3 ML DOSE VACCINE: ICD-10-PCS | Mod: CV19,S$GLB,, | Performed by: FAMILY MEDICINE

## 2021-05-04 RX ORDER — LANCETS
EACH MISCELLANEOUS
Qty: 200 EACH | Refills: 3 | Status: SHIPPED | OUTPATIENT
Start: 2021-05-04 | End: 2022-05-16 | Stop reason: SDUPTHER

## 2021-05-05 ENCOUNTER — PATIENT MESSAGE (OUTPATIENT)
Dept: PRIMARY CARE CLINIC | Facility: CLINIC | Age: 61
End: 2021-05-05

## 2021-05-05 ENCOUNTER — TELEPHONE (OUTPATIENT)
Dept: PRIMARY CARE CLINIC | Facility: CLINIC | Age: 61
End: 2021-05-05

## 2021-05-05 DIAGNOSIS — Z79.4 TYPE 2 DIABETES MELLITUS WITHOUT COMPLICATION, WITH LONG-TERM CURRENT USE OF INSULIN: ICD-10-CM

## 2021-05-05 DIAGNOSIS — E11.9 TYPE 2 DIABETES MELLITUS WITHOUT COMPLICATION, WITH LONG-TERM CURRENT USE OF INSULIN: ICD-10-CM

## 2021-05-05 RX ORDER — INSULIN GLARGINE 100 [IU]/ML
INJECTION, SOLUTION SUBCUTANEOUS
Qty: 45 ML | Refills: 3 | Status: CANCELLED
Start: 2021-05-05

## 2021-05-05 RX ORDER — INSULIN GLARGINE 100 [IU]/ML
INJECTION, SOLUTION SUBCUTANEOUS
Qty: 45 ML | Refills: 3
Start: 2021-05-05 | End: 2021-05-06 | Stop reason: SDUPTHER

## 2021-05-05 RX ORDER — LOSARTAN POTASSIUM 100 MG/1
100 TABLET ORAL DAILY
Qty: 90 TABLET | Refills: 3 | Status: SHIPPED | OUTPATIENT
Start: 2021-05-05 | End: 2022-05-16 | Stop reason: SDUPTHER

## 2021-05-06 ENCOUNTER — PATIENT MESSAGE (OUTPATIENT)
Dept: PRIMARY CARE CLINIC | Facility: CLINIC | Age: 61
End: 2021-05-06

## 2021-05-06 DIAGNOSIS — E11.9 TYPE 2 DIABETES MELLITUS WITHOUT COMPLICATION, WITH LONG-TERM CURRENT USE OF INSULIN: ICD-10-CM

## 2021-05-06 DIAGNOSIS — Z79.4 TYPE 2 DIABETES MELLITUS WITHOUT COMPLICATION, WITH LONG-TERM CURRENT USE OF INSULIN: ICD-10-CM

## 2021-05-06 RX ORDER — INSULIN GLARGINE 100 [IU]/ML
INJECTION, SOLUTION SUBCUTANEOUS
Qty: 45 ML | Refills: 3
Start: 2021-05-06 | End: 2021-05-10 | Stop reason: SDUPTHER

## 2021-05-10 DIAGNOSIS — E11.9 TYPE 2 DIABETES MELLITUS WITHOUT COMPLICATION, WITH LONG-TERM CURRENT USE OF INSULIN: ICD-10-CM

## 2021-05-10 DIAGNOSIS — Z79.4 TYPE 2 DIABETES MELLITUS WITHOUT COMPLICATION, WITH LONG-TERM CURRENT USE OF INSULIN: ICD-10-CM

## 2021-05-10 RX ORDER — INSULIN GLARGINE 100 [IU]/ML
INJECTION, SOLUTION SUBCUTANEOUS
Qty: 45 ML | Refills: 3 | Status: SHIPPED | OUTPATIENT
Start: 2021-05-10 | End: 2022-05-16 | Stop reason: SDUPTHER

## 2021-06-27 ENCOUNTER — PATIENT OUTREACH (OUTPATIENT)
Dept: ADMINISTRATIVE | Facility: OTHER | Age: 61
End: 2021-06-27

## 2021-06-28 ENCOUNTER — LAB VISIT (OUTPATIENT)
Dept: LAB | Facility: HOSPITAL | Age: 61
End: 2021-06-28
Attending: FAMILY MEDICINE
Payer: COMMERCIAL

## 2021-06-28 ENCOUNTER — OFFICE VISIT (OUTPATIENT)
Dept: OTOLARYNGOLOGY | Facility: CLINIC | Age: 61
End: 2021-06-28
Payer: COMMERCIAL

## 2021-06-28 ENCOUNTER — OFFICE VISIT (OUTPATIENT)
Dept: INTERNAL MEDICINE | Facility: CLINIC | Age: 61
End: 2021-06-28
Payer: COMMERCIAL

## 2021-06-28 VITALS — TEMPERATURE: 98 F | HEIGHT: 71 IN | BODY MASS INDEX: 29.2 KG/M2 | WEIGHT: 208.56 LBS

## 2021-06-28 VITALS
TEMPERATURE: 98 F | HEIGHT: 71 IN | OXYGEN SATURATION: 100 % | SYSTOLIC BLOOD PRESSURE: 138 MMHG | HEART RATE: 89 BPM | BODY MASS INDEX: 28.89 KG/M2 | DIASTOLIC BLOOD PRESSURE: 88 MMHG | WEIGHT: 206.38 LBS

## 2021-06-28 DIAGNOSIS — R73.9 HYPERGLYCEMIA: Primary | ICD-10-CM

## 2021-06-28 DIAGNOSIS — H90.0 CONDUCTIVE HEARING LOSS, BILATERAL: Primary | ICD-10-CM

## 2021-06-28 DIAGNOSIS — E11.9 TYPE 2 DIABETES MELLITUS WITHOUT COMPLICATION, WITH LONG-TERM CURRENT USE OF INSULIN: ICD-10-CM

## 2021-06-28 DIAGNOSIS — Z79.4 TYPE 2 DIABETES MELLITUS WITHOUT COMPLICATION, WITH LONG-TERM CURRENT USE OF INSULIN: ICD-10-CM

## 2021-06-28 LAB
ESTIMATED AVG GLUCOSE: 286 MG/DL (ref 68–131)
GLUCOSE SERPL-MCNC: 270 MG/DL (ref 70–110)
HBA1C MFR BLD: 11.6 % (ref 4–5.6)

## 2021-06-28 PROCEDURE — 82962 POCT GLUCOSE, HAND-HELD DEVICE: ICD-10-PCS | Mod: S$GLB,,, | Performed by: FAMILY MEDICINE

## 2021-06-28 PROCEDURE — 36415 COLL VENOUS BLD VENIPUNCTURE: CPT | Performed by: FAMILY MEDICINE

## 2021-06-28 PROCEDURE — 83036 HEMOGLOBIN GLYCOSYLATED A1C: CPT | Performed by: FAMILY MEDICINE

## 2021-06-28 PROCEDURE — 82962 GLUCOSE BLOOD TEST: CPT | Mod: S$GLB,,, | Performed by: FAMILY MEDICINE

## 2021-06-28 PROCEDURE — 99212 OFFICE O/P EST SF 10 MIN: CPT | Mod: S$GLB,,, | Performed by: ORTHOPAEDIC SURGERY

## 2021-06-28 PROCEDURE — 99999 PR PBB SHADOW E&M-EST. PATIENT-LVL IV: CPT | Mod: PBBFAC,,, | Performed by: FAMILY MEDICINE

## 2021-06-28 PROCEDURE — 99214 OFFICE O/P EST MOD 30 MIN: CPT | Mod: S$GLB,,, | Performed by: FAMILY MEDICINE

## 2021-06-28 PROCEDURE — 99999 PR PBB SHADOW E&M-EST. PATIENT-LVL III: ICD-10-PCS | Mod: PBBFAC,,, | Performed by: ORTHOPAEDIC SURGERY

## 2021-06-28 PROCEDURE — 99212 PR OFFICE/OUTPT VISIT, EST, LEVL II, 10-19 MIN: ICD-10-PCS | Mod: S$GLB,,, | Performed by: ORTHOPAEDIC SURGERY

## 2021-06-28 PROCEDURE — 99214 PR OFFICE/OUTPT VISIT, EST, LEVL IV, 30-39 MIN: ICD-10-PCS | Mod: S$GLB,,, | Performed by: FAMILY MEDICINE

## 2021-06-28 PROCEDURE — 99999 PR PBB SHADOW E&M-EST. PATIENT-LVL IV: ICD-10-PCS | Mod: PBBFAC,,, | Performed by: FAMILY MEDICINE

## 2021-06-28 PROCEDURE — 99999 PR PBB SHADOW E&M-EST. PATIENT-LVL III: CPT | Mod: PBBFAC,,, | Performed by: ORTHOPAEDIC SURGERY

## 2021-06-28 RX ORDER — METFORMIN HYDROCHLORIDE 500 MG/1
500 TABLET ORAL 2 TIMES DAILY WITH MEALS
Qty: 60 TABLET | Refills: 2 | Status: SHIPPED | OUTPATIENT
Start: 2021-06-28 | End: 2022-05-16 | Stop reason: ALTCHOICE

## 2021-12-07 ENCOUNTER — PATIENT OUTREACH (OUTPATIENT)
Dept: ADMINISTRATIVE | Facility: HOSPITAL | Age: 61
End: 2021-12-07
Payer: COMMERCIAL

## 2022-01-26 DIAGNOSIS — E11.9 TYPE 2 DIABETES MELLITUS WITHOUT COMPLICATION: ICD-10-CM

## 2022-02-09 DIAGNOSIS — E11.9 TYPE 2 DIABETES MELLITUS WITHOUT COMPLICATION: ICD-10-CM

## 2022-03-28 ENCOUNTER — PATIENT MESSAGE (OUTPATIENT)
Dept: ADMINISTRATIVE | Facility: HOSPITAL | Age: 62
End: 2022-03-28
Payer: COMMERCIAL

## 2022-04-04 ENCOUNTER — PATIENT OUTREACH (OUTPATIENT)
Dept: ADMINISTRATIVE | Facility: HOSPITAL | Age: 62
End: 2022-04-04
Payer: COMMERCIAL

## 2022-05-03 LAB
LEFT EYE DM RETINOPATHY: NEGATIVE
RIGHT EYE DM RETINOPATHY: NEGATIVE

## 2022-05-11 DIAGNOSIS — Z79.4 TYPE 2 DIABETES MELLITUS WITHOUT COMPLICATION, WITH LONG-TERM CURRENT USE OF INSULIN: ICD-10-CM

## 2022-05-11 DIAGNOSIS — E11.9 TYPE 2 DIABETES MELLITUS WITHOUT COMPLICATION, WITH LONG-TERM CURRENT USE OF INSULIN: ICD-10-CM

## 2022-05-12 RX ORDER — LOSARTAN POTASSIUM 100 MG/1
TABLET ORAL
Qty: 90 TABLET | Refills: 3 | OUTPATIENT
Start: 2022-05-12

## 2022-05-12 NOTE — TELEPHONE ENCOUNTER
Refill Routing Note   Medication(s) are not appropriate for processing by [facility] for the following reason(s):      - Patient has not been seen in over 15 months by PCP  - Required laboratory values are outdated    [facility] action(s):  Defer Medication-related problems identified:   Requires labs  Requires appointment        Medication reconciliation completed: No     Appointments  past 12m or future 3m with PCP    Date Provider   Last Visit   4/1/2021 Holly Shepherd MD   Next Visit   Visit date not found Holly Shepherd MD   ED visits in past 90 days: 0        Note composed:7:29 PM 05/11/2022

## 2022-05-16 ENCOUNTER — OFFICE VISIT (OUTPATIENT)
Dept: PRIMARY CARE CLINIC | Facility: CLINIC | Age: 62
End: 2022-05-16
Payer: COMMERCIAL

## 2022-05-16 VITALS
BODY MASS INDEX: 30.56 KG/M2 | HEART RATE: 74 BPM | SYSTOLIC BLOOD PRESSURE: 122 MMHG | DIASTOLIC BLOOD PRESSURE: 76 MMHG | HEIGHT: 71 IN | WEIGHT: 218.25 LBS

## 2022-05-16 DIAGNOSIS — Z00.01 ENCOUNTER FOR PREVENTATIVE ADULT HEALTH CARE EXAM WITH ABNORMAL FINDINGS: Primary | ICD-10-CM

## 2022-05-16 DIAGNOSIS — Z79.4 TYPE 2 DIABETES MELLITUS WITHOUT COMPLICATION, WITH LONG-TERM CURRENT USE OF INSULIN: ICD-10-CM

## 2022-05-16 DIAGNOSIS — E11.9 TYPE 2 DIABETES MELLITUS WITHOUT COMPLICATION, WITH LONG-TERM CURRENT USE OF INSULIN: ICD-10-CM

## 2022-05-16 DIAGNOSIS — E78.5 HYPERLIPIDEMIA ASSOCIATED WITH TYPE 2 DIABETES MELLITUS: ICD-10-CM

## 2022-05-16 DIAGNOSIS — E11.59 HYPERTENSION ASSOCIATED WITH DIABETES: ICD-10-CM

## 2022-05-16 DIAGNOSIS — M47.817 FACET ARTHRITIS, DEGENERATIVE, L5-S1 LEVEL, LUMBOSACRAL SPINE: ICD-10-CM

## 2022-05-16 DIAGNOSIS — Z12.5 PROSTATE CANCER SCREENING: ICD-10-CM

## 2022-05-16 DIAGNOSIS — E11.69 HYPERLIPIDEMIA ASSOCIATED WITH TYPE 2 DIABETES MELLITUS: ICD-10-CM

## 2022-05-16 DIAGNOSIS — I15.2 HYPERTENSION ASSOCIATED WITH DIABETES: ICD-10-CM

## 2022-05-16 PROCEDURE — 99396 PR PREVENTIVE VISIT,EST,40-64: ICD-10-PCS | Mod: S$GLB,,, | Performed by: FAMILY MEDICINE

## 2022-05-16 PROCEDURE — 99999 PR PBB SHADOW E&M-EST. PATIENT-LVL III: CPT | Mod: PBBFAC,,, | Performed by: FAMILY MEDICINE

## 2022-05-16 PROCEDURE — 99999 PR PBB SHADOW E&M-EST. PATIENT-LVL III: ICD-10-PCS | Mod: PBBFAC,,, | Performed by: FAMILY MEDICINE

## 2022-05-16 PROCEDURE — 99396 PREV VISIT EST AGE 40-64: CPT | Mod: S$GLB,,, | Performed by: FAMILY MEDICINE

## 2022-05-16 RX ORDER — ATORVASTATIN CALCIUM 40 MG/1
40 TABLET, FILM COATED ORAL DAILY
Qty: 90 TABLET | Refills: 3 | Status: SHIPPED | OUTPATIENT
Start: 2022-05-16 | End: 2023-05-16

## 2022-05-16 RX ORDER — FLUTICASONE PROPIONATE 50 MCG
SPRAY, SUSPENSION (ML) NASAL
Qty: 16 ML | Refills: 11 | Status: CANCELLED | OUTPATIENT
Start: 2022-05-16

## 2022-05-16 RX ORDER — LANCETS
EACH MISCELLANEOUS
Qty: 200 EACH | Refills: 3 | Status: SHIPPED | OUTPATIENT
Start: 2022-05-16

## 2022-05-16 RX ORDER — INSULIN GLARGINE 100 [IU]/ML
INJECTION, SOLUTION SUBCUTANEOUS
Qty: 45 ML | Refills: 3 | Status: SHIPPED | OUTPATIENT
Start: 2022-05-16

## 2022-05-16 RX ORDER — LOSARTAN POTASSIUM 100 MG/1
100 TABLET ORAL DAILY
Qty: 90 TABLET | Refills: 3 | Status: SHIPPED | OUTPATIENT
Start: 2022-05-16 | End: 2023-05-24

## 2022-05-16 RX ORDER — SEMAGLUTIDE 1.34 MG/ML
1 INJECTION, SOLUTION SUBCUTANEOUS
Qty: 3 PEN | Refills: 4 | Status: SHIPPED | OUTPATIENT
Start: 2022-05-16

## 2022-05-16 RX ORDER — PEN NEEDLE, DIABETIC 30 GX3/16"
NEEDLE, DISPOSABLE MISCELLANEOUS
Qty: 100 EACH | Refills: 10 | Status: SHIPPED | OUTPATIENT
Start: 2022-05-16

## 2022-05-16 RX ORDER — LIRAGLUTIDE 6 MG/ML
1.8 INJECTION SUBCUTANEOUS DAILY
Qty: 27 ML | Refills: 3 | Status: CANCELLED | OUTPATIENT
Start: 2022-05-16 | End: 2023-05-16

## 2022-05-16 NOTE — PROGRESS NOTES
Subjective:      Patient ID: Epifanio Godoy is a 62 y.o. male.    Chief Complaint: Annual Exam    Disclaimer:  This note is prepared using voice recognition software and as such is likely to have errors and has not been proof read. Please contact me for questions.     Patient is here for followup of multiple issues. Will be staying in [place] now. Was in and out of state before for jobs. Back on meds. Doing better. Plans on labs today.   Had rash break out with soliqua. Now back on lantus at 25units daily and victoza at 1.8mg daily. Sugars 120 per his report.  Wants them to be closer to the 100. Would be willing to do a weekly injection instead.  In to work on weight loss.    Still doing his Lipitor 40. Did do an eye exam at [facility] last week.  Still has back pain off and on his changes mattress 2 different times.  Not interested in doing pain medicines but may be interested in the future in doing procedures such as JUAN injections.    bp is controlled. Foot exam today.     Diabetes  He presents for his follow-up diabetic visit. He has type 2 diabetes mellitus. No MedicAlert identification noted. The initial diagnosis of diabetes was made 15 years ago. Pertinent negatives for hypoglycemia include no dizziness, headaches, nervousness/anxiousness or seizures. Pertinent negatives for diabetes include no chest pain, no fatigue and no visual change. Risk factors for coronary artery disease include family history, hypertension, tobacco exposure and diabetes mellitus. Current diabetic treatment includes insulin injections and oral agent (dual therapy). He is compliant with treatment all of the time. He is currently taking insulin pre-breakfast. Insulin injections are given by patient. Rotation sites for injection include the abdominal wall. His weight is fluctuating minimally. He is following a generally unhealthy diet. When asked about meal planning, he reported none. He has not had a previous visit with a  dietitian. He participates in exercise intermittently. He monitors blood glucose at home 1-2 x per day. He monitors urine at home <1 x per month. Blood glucose monitoring compliance is excellent. There is no change in his home blood glucose trend. He does not see a podiatrist.Eye exam is current.     Lab Results   Component Value Date    HGBA1C 6.3 (H) 11/22/2021    HGBA1C 11.8 (H) 07/01/2021    HGBA1C 11.6 (H) 06/28/2021      Lab Results   Component Value Date    CHOL 209 (H) 04/01/2021    CHOL 173 01/25/2021    CHOL 124 07/17/2020     Lab Results   Component Value Date    LDLCALC 109.8 04/01/2021    LDLCALC 110.0 01/25/2021    LDLCALC 66.2 07/17/2020       Wt Readings from Last 10 Encounters:   05/16/22 99 kg (218 lb 4.1 oz)   06/28/21 94.6 kg (208 lb 8.9 oz)   06/28/21 93.6 kg (206 lb 5.6 oz)   04/14/21 99.3 kg (218 lb 14.7 oz)   04/01/21 99.6 kg (219 lb 11 oz)   01/25/21 98.1 kg (216 lb 6.1 oz)   09/15/20 98 kg (216 lb)   08/27/20 98.4 kg (216 lb 13.2 oz)   08/06/20 94.2 kg (207 lb 10.8 oz)   07/17/20 96.6 kg (212 lb 15.4 oz)       The 10-year ASCVD risk score ([place] SORAYA Lombardo., et al., 2013) is: 36.5%    Values used to calculate the score:      Age: 62 years      Sex: Male      Is Non- : Yes      Diabetic: Yes      Tobacco smoker: Yes      Systolic Blood Pressure: 122 mmHg      Is BP treated: Yes      HDL Cholesterol: 45 mg/dL      Total Cholesterol: 209 mg/dL        Lab Results   Component Value Date    WBC 5.28 04/01/2021    HGB 12.6 (L) 04/01/2021    HCT 40.9 04/01/2021     04/01/2021    CHOL 209 (H) 04/01/2021    TRIG 271 (H) 04/01/2021    HDL 45 04/01/2021    ALT 34 04/01/2021    AST 22 04/01/2021     04/01/2021    K 4.5 04/01/2021     04/01/2021    CREATININE 1.1 04/01/2021    BUN 12 04/01/2021    CO2 27 04/01/2021    TSH 0.945 01/25/2021    PSA 0.27 01/25/2021    HGBA1C 6.3 (H) 11/22/2021       X-Ray Lumbar Spine 2 Or 3 Views  Narrative: EXAMINATION:  XR LUMBAR SPINE  "2 OR 3 VIEWS    CLINICAL HISTORY:  Back pain or radiculopathy, > 6 wks;Lumbosacral osteoarthritis;  Spondylosis without myelopathy or radiculopathy, lumbosacral region    TECHNIQUE:  AP/lateral views    COMPARISON:  04/27/2016    FINDINGS:  Vertebral body heights and alignment unchanged without spondylolisthesis.  L5-S1 disc height loss present with lower lumbar spine degenerative facet arthropathy and osteophyte changes.  No acute osseous or soft tissue abnormality.  Impression: Lower lumbar spine degenerative findings    Electronically signed by: Guillermo Berry MD  Date:    01/25/2021  Time:    10:52        Review of Systems   Constitutional: Negative for activity change, appetite change, chills, fatigue and unexpected weight change.   HENT: Negative for congestion, ear pain, postnasal drip, sneezing, sore throat and trouble swallowing.    Eyes: Negative for pain and visual disturbance.   Respiratory: Negative for cough and shortness of breath.    Cardiovascular: Negative for chest pain and leg swelling.   Gastrointestinal: Negative for abdominal pain, constipation, diarrhea, nausea and vomiting.   Endocrine: Negative for cold intolerance and heat intolerance.   Genitourinary: Negative for difficulty urinating, dysuria and flank pain.   Musculoskeletal: Negative for arthralgias, back pain, joint swelling and neck pain.   Skin: Negative for color change and rash.   Neurological: Negative for dizziness, seizures and headaches.   Psychiatric/Behavioral: Negative for behavioral problems, dysphoric mood and sleep disturbance. The patient is not nervous/anxious.      Objective:     Vitals:    05/16/22 1502   BP: 122/76   Pulse: 74   Weight: 99 kg (218 lb 4.1 oz)   Height: 5' 11" (1.803 m)     Physical Exam  Vitals reviewed.   Constitutional:       Appearance: Normal appearance. He is well-developed. He is obese.   HENT:      Head: Normocephalic and atraumatic.      Right Ear: Tympanic membrane and external ear " normal.      Left Ear: Tympanic membrane and external ear normal.      Nose: Nose normal.      Mouth/Throat:      Mouth: Mucous membranes are moist.      Pharynx: Oropharynx is clear. No oropharyngeal exudate.   Eyes:      Extraocular Movements: Extraocular movements intact.      Conjunctiva/sclera: Conjunctivae normal.      Pupils: Pupils are equal, round, and reactive to light.   Neck:      Thyroid: No thyromegaly.   Cardiovascular:      Rate and Rhythm: Normal rate and regular rhythm.      Pulses:           Dorsalis pedis pulses are 2+ on the right side and 2+ on the left side.      Heart sounds: Normal heart sounds.   Pulmonary:      Effort: Pulmonary effort is normal.      Breath sounds: Normal breath sounds.   Abdominal:      General: Bowel sounds are normal.      Palpations: Abdomen is soft.   Musculoskeletal:         General: Normal range of motion.      Cervical back: Normal range of motion and neck supple.      Right foot: Normal range of motion. No deformity.      Left foot: Normal range of motion. No deformity.   Feet:      Right foot:      Protective Sensation: 4 sites tested. 4 sites sensed.      Skin integrity: Warmth present. No ulcer, blister, skin breakdown, erythema, callus or dry skin.      Left foot:      Protective Sensation: 4 sites tested. 4 sites sensed.      Skin integrity: Warmth present. No ulcer, blister, skin breakdown, erythema, callus or dry skin.   Skin:     General: Skin is warm and dry.      Findings: No erythema.   Neurological:      General: No focal deficit present.      Mental Status: He is alert and oriented to person, place, and time. Mental status is at baseline.      Sensory: No sensory deficit.      Motor: No atrophy or abnormal muscle tone.   Psychiatric:         Mood and Affect: Mood normal.         Speech: Speech normal.         Behavior: Behavior normal.         Thought Content: Thought content normal.         Judgment: Judgment normal.       Assessment:     1.  Encounter for preventative adult health care exam with abnormal findings    2. Hyperlipidemia associated with type 2 diabetes mellitus    3. Type 2 diabetes mellitus without complication, with long-term current use of insulin    4. Prostate cancer screening    5. Hypertension associated with diabetes    6. Facet arthritis, degenerative, L5-S1 level, lumbosacral spine      Plan:   Epifanio was seen today for annual exam.    Diagnoses and all orders for this visit:    Encounter for preventative adult health care exam with abnormal findings-labs ordered today foot exam performed today update copies of his most recent diabetic eye exam.  -     TSH; Future  -     Hemoglobin A1C; Future  -     Lipid Panel; Future  -     Comprehensive Metabolic Panel; Future  -     CBC Auto Differential; Future  -     PSA, Screening; Future    Hyperlipidemia associated with type 2 diabetes mellitus labs ordered this week fasting continue Lipitor 40 adjust medicines once results back  -     Hemoglobin A1C; Future  -     Lipid Panel; Future  -     Comprehensive Metabolic Panel; Future  -     CBC Auto Differential; Future  -     atorvastatin (LIPITOR) 40 MG tablet; Take 1 tablet (40 mg total) by mouth once daily.    Type 2 diabetes mellitus without complication, with long-term current use of insulin-desires to change from Victoza daily to weekly dosing will transition to Ozempic 1 mg weekly can follow-up in 3 months with repeat labs with PA to see if adjustment needing up to 2 mg dosing.  Continue Lantus 25 units daily.  -     TSH; Future  -     Hemoglobin A1C; Future  -     Lipid Panel; Future  -     Comprehensive Metabolic Panel; Future  -     CBC Auto Differential; Future  -     Microalbumin/Creatinine Ratio, Urine; Future  -     atorvastatin (LIPITOR) 40 MG tablet; Take 1 tablet (40 mg total) by mouth once daily.  -     insulin (LANTUS SOLOSTAR U-100 INSULIN) glargine 100 units/mL (3mL) SubQ pen; INJECT 25 UNITS INTO THE SKIN ONCE DAILY.  -    "  losartan (COZAAR) 100 MG tablet; Take 1 tablet (100 mg total) by mouth once daily. 1 Tablet Oral Every day  -     pen needle, diabetic (BD ULTRA-FINE MINI PEN NEEDLE) 31 gauge x 3/16" Ndle; USE 3 TIMES PER DAY  -     lancets Misc; To check BG 2-3 times daily, to use with insurance preferred meter    Prostate cancer screening with labs on Wednesday  -     PSA, Screening; Future    Hypertension associated with diabetes-controlled at this time continue current medications urine microalbumin ordered    Facet arthritis, degenerative, L5-S1 level, lumbosacral spine declines referral today but may be interested in the future for pain management referral    Other orders  -     semaglutide (OZEMPIC) 1 mg/dose (4 mg/3 mL); Inject 1 mg into the skin every 7 days.  The following orders have not been finalized:  -     Cancel: fluticasone propionate (FLONASE) 50 mcg/actuation nasal spray  -     Cancel: liraglutide 0.6 mg/0.1 mL, 18 mg/3 mL, subq PNIJ (VICTOZA 3-JULIETTE) 0.6 mg/0.1 mL (18 mg/3 mL) PnIj pen            Follow up in about 3 months (around 8/16/2022) for f/u LAUREN Kirkpatrick/ ozempic/ saranya .    There are no Patient Instructions on file for this visit.                  "

## 2022-05-18 ENCOUNTER — LAB VISIT (OUTPATIENT)
Dept: LAB | Facility: HOSPITAL | Age: 62
End: 2022-05-18
Attending: FAMILY MEDICINE
Payer: COMMERCIAL

## 2022-05-18 DIAGNOSIS — E11.9 TYPE 2 DIABETES MELLITUS WITHOUT COMPLICATION: ICD-10-CM

## 2022-05-18 DIAGNOSIS — Z12.5 PROSTATE CANCER SCREENING: ICD-10-CM

## 2022-05-18 DIAGNOSIS — Z79.4 TYPE 2 DIABETES MELLITUS WITHOUT COMPLICATION, WITH LONG-TERM CURRENT USE OF INSULIN: ICD-10-CM

## 2022-05-18 DIAGNOSIS — Z00.01 ENCOUNTER FOR PREVENTATIVE ADULT HEALTH CARE EXAM WITH ABNORMAL FINDINGS: ICD-10-CM

## 2022-05-18 DIAGNOSIS — E11.9 TYPE 2 DIABETES MELLITUS WITHOUT COMPLICATION, WITH LONG-TERM CURRENT USE OF INSULIN: ICD-10-CM

## 2022-05-18 DIAGNOSIS — E11.69 HYPERLIPIDEMIA ASSOCIATED WITH TYPE 2 DIABETES MELLITUS: ICD-10-CM

## 2022-05-18 DIAGNOSIS — E78.5 HYPERLIPIDEMIA ASSOCIATED WITH TYPE 2 DIABETES MELLITUS: ICD-10-CM

## 2022-05-18 LAB
ESTIMATED AVG GLUCOSE: 151 MG/DL (ref 68–131)
ESTIMATED AVG GLUCOSE: 151 MG/DL (ref 68–131)
HBA1C MFR BLD: 6.9 % (ref 4–5.6)
HBA1C MFR BLD: 6.9 % (ref 4–5.6)

## 2022-05-18 PROCEDURE — 83036 HEMOGLOBIN GLYCOSYLATED A1C: CPT | Performed by: FAMILY MEDICINE

## 2022-05-18 PROCEDURE — 80053 COMPREHEN METABOLIC PANEL: CPT | Performed by: FAMILY MEDICINE

## 2022-05-18 PROCEDURE — 85025 COMPLETE CBC W/AUTO DIFF WBC: CPT | Performed by: FAMILY MEDICINE

## 2022-05-18 PROCEDURE — 36415 COLL VENOUS BLD VENIPUNCTURE: CPT | Mod: PO | Performed by: FAMILY MEDICINE

## 2022-05-18 PROCEDURE — 84153 ASSAY OF PSA TOTAL: CPT | Performed by: FAMILY MEDICINE

## 2022-05-18 PROCEDURE — 80061 LIPID PANEL: CPT | Performed by: FAMILY MEDICINE

## 2022-05-18 PROCEDURE — 84443 ASSAY THYROID STIM HORMONE: CPT | Performed by: FAMILY MEDICINE

## 2022-05-19 LAB
ALBUMIN SERPL BCP-MCNC: 3.6 G/DL (ref 3.5–5.2)
ALP SERPL-CCNC: 64 U/L (ref 55–135)
ALT SERPL W/O P-5'-P-CCNC: 20 U/L (ref 10–44)
ANION GAP SERPL CALC-SCNC: 8 MMOL/L (ref 8–16)
AST SERPL-CCNC: 23 U/L (ref 10–40)
BASOPHILS # BLD AUTO: 0.01 K/UL (ref 0–0.2)
BASOPHILS NFR BLD: 0.2 % (ref 0–1.9)
BILIRUB SERPL-MCNC: 0.2 MG/DL (ref 0.1–1)
BUN SERPL-MCNC: 13 MG/DL (ref 6–20)
CALCIUM SERPL-MCNC: 8.8 MG/DL (ref 8.7–10.5)
CHLORIDE SERPL-SCNC: 107 MMOL/L (ref 95–110)
CHOLEST SERPL-MCNC: 127 MG/DL (ref 120–199)
CHOLEST/HDLC SERPL: 3 {RATIO} (ref 2–5)
CO2 SERPL-SCNC: 24 MMOL/L (ref 23–29)
COMPLEXED PSA SERPL-MCNC: 0.66 NG/ML (ref 0–4)
CREAT SERPL-MCNC: 0.8 MG/DL (ref 0.5–1.4)
DIFFERENTIAL METHOD: ABNORMAL
EOSINOPHIL # BLD AUTO: 0.1 K/UL (ref 0–0.5)
EOSINOPHIL NFR BLD: 2.4 % (ref 0–8)
ERYTHROCYTE [DISTWIDTH] IN BLOOD BY AUTOMATED COUNT: 17.7 % (ref 11.5–14.5)
EST. GFR  (AFRICAN AMERICAN): >60 ML/MIN/1.73 M^2
EST. GFR  (NON AFRICAN AMERICAN): >60 ML/MIN/1.73 M^2
GLUCOSE SERPL-MCNC: 95 MG/DL (ref 70–110)
HCT VFR BLD AUTO: 34.2 % (ref 40–54)
HDLC SERPL-MCNC: 43 MG/DL (ref 40–75)
HDLC SERPL: 33.9 % (ref 20–50)
HGB BLD-MCNC: 9.7 G/DL (ref 14–18)
IMM GRANULOCYTES # BLD AUTO: 0.01 K/UL (ref 0–0.04)
IMM GRANULOCYTES NFR BLD AUTO: 0.2 % (ref 0–0.5)
LDLC SERPL CALC-MCNC: 71.8 MG/DL (ref 63–159)
LYMPHOCYTES # BLD AUTO: 1.6 K/UL (ref 1–4.8)
LYMPHOCYTES NFR BLD: 39.1 % (ref 18–48)
MCH RBC QN AUTO: 23 PG (ref 27–31)
MCHC RBC AUTO-ENTMCNC: 28.4 G/DL (ref 32–36)
MCV RBC AUTO: 81 FL (ref 82–98)
MONOCYTES # BLD AUTO: 0.4 K/UL (ref 0.3–1)
MONOCYTES NFR BLD: 10.3 % (ref 4–15)
NEUTROPHILS # BLD AUTO: 2 K/UL (ref 1.8–7.7)
NEUTROPHILS NFR BLD: 47.8 % (ref 38–73)
NONHDLC SERPL-MCNC: 84 MG/DL
NRBC BLD-RTO: 0 /100 WBC
PLATELET # BLD AUTO: 586 K/UL (ref 150–450)
PMV BLD AUTO: 9.4 FL (ref 9.2–12.9)
POTASSIUM SERPL-SCNC: 4.3 MMOL/L (ref 3.5–5.1)
PROT SERPL-MCNC: 7.1 G/DL (ref 6–8.4)
RBC # BLD AUTO: 4.22 M/UL (ref 4.6–6.2)
SODIUM SERPL-SCNC: 139 MMOL/L (ref 136–145)
TRIGL SERPL-MCNC: 61 MG/DL (ref 30–150)
TSH SERPL DL<=0.005 MIU/L-ACNC: 1.4 UIU/ML (ref 0.4–4)
WBC # BLD AUTO: 4.17 K/UL (ref 3.9–12.7)

## 2022-05-22 ENCOUNTER — PATIENT MESSAGE (OUTPATIENT)
Dept: PRIMARY CARE CLINIC | Facility: CLINIC | Age: 62
End: 2022-05-22
Payer: COMMERCIAL

## 2022-05-22 DIAGNOSIS — D64.9 ANEMIA, UNSPECIFIED TYPE: Primary | ICD-10-CM

## 2022-05-23 ENCOUNTER — PATIENT MESSAGE (OUTPATIENT)
Dept: PRIMARY CARE CLINIC | Facility: CLINIC | Age: 62
End: 2022-05-23
Payer: COMMERCIAL

## 2022-05-23 NOTE — TELEPHONE ENCOUNTER
Please schedule additional labs for anemia.   Also ask patient if he has had any rectal bleeding or blood with urination? Any stomach or gastritis issues? Has he been taking any ibuprofen or anti-inflammatories recently?   Did he donate blood recently? Or plasma?     This is a sudden drop in his blood counts.

## 2022-05-27 ENCOUNTER — PATIENT OUTREACH (OUTPATIENT)
Dept: ADMINISTRATIVE | Facility: HOSPITAL | Age: 62
End: 2022-05-27
Payer: COMMERCIAL

## 2022-12-05 NOTE — TELEPHONE ENCOUNTER
----- Message from Divina Umana sent at 8/7/2019 11:40 AM CDT -----  Contact: Crystal Morales   Caller was calling in regards to medical records for pt. Caller can be reached [phone].  
I returned call and there was no answer  left.aa  
Detail Level: Generalized
General Sunscreen Counseling: I recommended a broad spectrum sunscreen with a SPF of 30 or higher.  I explained that SPF 30 sunscreens block approximately 97 percent of the sun's harmful rays.  Sunscreens should be applied at least 15 minutes prior to expected sun exposure and then every 2 hours after that as long as sun exposure continues. If swimming or exercising sunscreen should be reapplied every 45 minutes to an hour after getting wet or sweating.  One ounce, or the equivalent of a shot glass full of sunscreen, is adequate to protect the skin not covered by a bathing suit. I also recommended a lip balm with a sunscreen as well. Sun protective clothing can be used in lieu of sunscreen but must be worn the entire time you are exposed to the sun's rays.
6

## 2022-12-20 DIAGNOSIS — E11.9 TYPE 2 DIABETES MELLITUS WITHOUT COMPLICATION: ICD-10-CM

## 2023-01-25 ENCOUNTER — PATIENT MESSAGE (OUTPATIENT)
Dept: ADMINISTRATIVE | Facility: HOSPITAL | Age: 63
End: 2023-01-25
Payer: COMMERCIAL

## 2023-02-06 ENCOUNTER — TELEPHONE (OUTPATIENT)
Dept: PRIMARY CARE CLINIC | Facility: CLINIC | Age: 63
End: 2023-02-06
Payer: COMMERCIAL

## 2023-02-06 DIAGNOSIS — E11.59 HYPERTENSION ASSOCIATED WITH DIABETES: ICD-10-CM

## 2023-02-06 DIAGNOSIS — E11.9 TYPE 2 DIABETES MELLITUS WITHOUT COMPLICATION, WITH LONG-TERM CURRENT USE OF INSULIN: ICD-10-CM

## 2023-02-06 DIAGNOSIS — E11.69 HYPERLIPIDEMIA ASSOCIATED WITH TYPE 2 DIABETES MELLITUS: ICD-10-CM

## 2023-02-06 DIAGNOSIS — D64.9 ANEMIA, UNSPECIFIED TYPE: ICD-10-CM

## 2023-02-06 DIAGNOSIS — E78.5 HYPERLIPIDEMIA ASSOCIATED WITH TYPE 2 DIABETES MELLITUS: ICD-10-CM

## 2023-02-06 DIAGNOSIS — Z79.4 TYPE 2 DIABETES MELLITUS WITHOUT COMPLICATION, WITH LONG-TERM CURRENT USE OF INSULIN: ICD-10-CM

## 2023-02-06 DIAGNOSIS — Z00.00 ROUTINE GENERAL MEDICAL EXAMINATION AT A HEALTH CARE FACILITY: Primary | ICD-10-CM

## 2023-02-06 DIAGNOSIS — I15.2 HYPERTENSION ASSOCIATED WITH DIABETES: ICD-10-CM

## 2023-02-06 NOTE — TELEPHONE ENCOUNTER
I have signed for the following orders AND/OR meds.  Please call the patient and ask the patient to schedule the testing AND/OR inform about any medications that were sent.      Orders Placed This Encounter   Procedures    TSH     Standing Status:   Future     Standing Expiration Date:   4/6/2024    Hemoglobin A1C     Standing Status:   Future     Standing Expiration Date:   4/6/2024    Lipid Panel     Standing Status:   Future     Standing Expiration Date:   4/6/2024    Comprehensive Metabolic Panel     Standing Status:   Future     Standing Expiration Date:   4/6/2024    CBC Auto Differential     Standing Status:   Future     Standing Expiration Date:   4/6/2024    Microalbumin/Creatinine Ratio, Urine     Standing Status:   Future     Standing Expiration Date:   8/6/2024     Order Specific Question:   Specimen Source     Answer:   Urine

## 2023-04-19 ENCOUNTER — PATIENT MESSAGE (OUTPATIENT)
Dept: ADMINISTRATIVE | Facility: HOSPITAL | Age: 63
End: 2023-04-19
Payer: COMMERCIAL

## 2023-07-22 NOTE — PROGRESS NOTES
Epifanio Godoy,    Your labs have been reviewed. Your blood counts show a significant anemia for you so I would like to do some additional testing but also ask if you have questions.    Heavy recently donated blood or plasma?  Have you had any rectal bleeding or blood with urination?  Has he had any stomach or gastritis issues?  Have you been taking regularly any ibuprofen or anti-inflammatories?    Otherwise your  cholesterol, sugar, kidney functions, liver functions, and thyroid functions are within acceptable ranges for your age and conditions.  Any abnormalities that you see at this time are not indicative of needing additional workup or concern.  Please continue on your current treatment plan. Please let me know if you have further questions.     Holly Shepherd No

## 2023-08-15 ENCOUNTER — OFFICE VISIT (OUTPATIENT)
Dept: RHEUMATOLOGY | Facility: CLINIC | Age: 63
End: 2023-08-15
Payer: COMMERCIAL

## 2023-08-15 VITALS
SYSTOLIC BLOOD PRESSURE: 120 MMHG | HEART RATE: 62 BPM | WEIGHT: 210.75 LBS | BODY MASS INDEX: 29.5 KG/M2 | DIASTOLIC BLOOD PRESSURE: 75 MMHG | HEIGHT: 71 IN

## 2023-08-15 DIAGNOSIS — G89.29 CHRONIC BILATERAL LOW BACK PAIN WITH BILATERAL SCIATICA: ICD-10-CM

## 2023-08-15 DIAGNOSIS — M47.817 FACET ARTHRITIS, DEGENERATIVE, L5-S1 LEVEL, LUMBOSACRAL SPINE: Primary | ICD-10-CM

## 2023-08-15 DIAGNOSIS — M54.42 CHRONIC BILATERAL LOW BACK PAIN WITH BILATERAL SCIATICA: ICD-10-CM

## 2023-08-15 DIAGNOSIS — M54.41 CHRONIC BILATERAL LOW BACK PAIN WITH BILATERAL SCIATICA: ICD-10-CM

## 2023-08-15 PROCEDURE — 3078F DIAST BP <80 MM HG: CPT | Mod: CPTII,S$GLB,, | Performed by: STUDENT IN AN ORGANIZED HEALTH CARE EDUCATION/TRAINING PROGRAM

## 2023-08-15 PROCEDURE — 1160F PR REVIEW ALL MEDS BY PRESCRIBER/CLIN PHARMACIST DOCUMENTED: ICD-10-PCS | Mod: CPTII,S$GLB,, | Performed by: STUDENT IN AN ORGANIZED HEALTH CARE EDUCATION/TRAINING PROGRAM

## 2023-08-15 PROCEDURE — 99204 OFFICE O/P NEW MOD 45 MIN: CPT | Mod: S$GLB,,, | Performed by: STUDENT IN AN ORGANIZED HEALTH CARE EDUCATION/TRAINING PROGRAM

## 2023-08-15 PROCEDURE — 1159F PR MEDICATION LIST DOCUMENTED IN MEDICAL RECORD: ICD-10-PCS | Mod: CPTII,S$GLB,, | Performed by: STUDENT IN AN ORGANIZED HEALTH CARE EDUCATION/TRAINING PROGRAM

## 2023-08-15 PROCEDURE — 99999 PR PBB SHADOW E&M-EST. PATIENT-LVL V: ICD-10-PCS | Mod: PBBFAC,,, | Performed by: STUDENT IN AN ORGANIZED HEALTH CARE EDUCATION/TRAINING PROGRAM

## 2023-08-15 PROCEDURE — 3074F SYST BP LT 130 MM HG: CPT | Mod: CPTII,S$GLB,, | Performed by: STUDENT IN AN ORGANIZED HEALTH CARE EDUCATION/TRAINING PROGRAM

## 2023-08-15 PROCEDURE — 3008F BODY MASS INDEX DOCD: CPT | Mod: CPTII,S$GLB,, | Performed by: STUDENT IN AN ORGANIZED HEALTH CARE EDUCATION/TRAINING PROGRAM

## 2023-08-15 PROCEDURE — 3078F PR MOST RECENT DIASTOLIC BLOOD PRESSURE < 80 MM HG: ICD-10-PCS | Mod: CPTII,S$GLB,, | Performed by: STUDENT IN AN ORGANIZED HEALTH CARE EDUCATION/TRAINING PROGRAM

## 2023-08-15 PROCEDURE — 99999 PR PBB SHADOW E&M-EST. PATIENT-LVL V: CPT | Mod: PBBFAC,,, | Performed by: STUDENT IN AN ORGANIZED HEALTH CARE EDUCATION/TRAINING PROGRAM

## 2023-08-15 PROCEDURE — 3008F PR BODY MASS INDEX (BMI) DOCUMENTED: ICD-10-PCS | Mod: CPTII,S$GLB,, | Performed by: STUDENT IN AN ORGANIZED HEALTH CARE EDUCATION/TRAINING PROGRAM

## 2023-08-15 PROCEDURE — 4010F ACE/ARB THERAPY RXD/TAKEN: CPT | Mod: CPTII,S$GLB,, | Performed by: STUDENT IN AN ORGANIZED HEALTH CARE EDUCATION/TRAINING PROGRAM

## 2023-08-15 PROCEDURE — 1159F MED LIST DOCD IN RCRD: CPT | Mod: CPTII,S$GLB,, | Performed by: STUDENT IN AN ORGANIZED HEALTH CARE EDUCATION/TRAINING PROGRAM

## 2023-08-15 PROCEDURE — 4010F PR ACE/ARB THEARPY RXD/TAKEN: ICD-10-PCS | Mod: CPTII,S$GLB,, | Performed by: STUDENT IN AN ORGANIZED HEALTH CARE EDUCATION/TRAINING PROGRAM

## 2023-08-15 PROCEDURE — 99204 PR OFFICE/OUTPT VISIT, NEW, LEVL IV, 45-59 MIN: ICD-10-PCS | Mod: S$GLB,,, | Performed by: STUDENT IN AN ORGANIZED HEALTH CARE EDUCATION/TRAINING PROGRAM

## 2023-08-15 PROCEDURE — 3074F PR MOST RECENT SYSTOLIC BLOOD PRESSURE < 130 MM HG: ICD-10-PCS | Mod: CPTII,S$GLB,, | Performed by: STUDENT IN AN ORGANIZED HEALTH CARE EDUCATION/TRAINING PROGRAM

## 2023-08-15 PROCEDURE — 1160F RVW MEDS BY RX/DR IN RCRD: CPT | Mod: CPTII,S$GLB,, | Performed by: STUDENT IN AN ORGANIZED HEALTH CARE EDUCATION/TRAINING PROGRAM

## 2023-08-15 RX ORDER — SILDENAFIL 100 MG/1
100 TABLET, FILM COATED ORAL
COMMUNITY
Start: 2023-05-29

## 2023-08-15 RX ORDER — CLOPIDOGREL BISULFATE 75 MG/1
75 TABLET ORAL
COMMUNITY
Start: 2023-07-31 | End: 2023-08-30

## 2023-08-15 RX ORDER — AMLODIPINE BESYLATE 5 MG/1
5 TABLET ORAL EVERY MORNING
COMMUNITY
Start: 2023-06-05

## 2023-08-15 NOTE — PROGRESS NOTES
RHEUMATOLOGY CLINIC INITIAL VISIT    Reason for consult:- concern for rheumatoid arthritis    Chief complaints, HPI, ROS, EXAM, Assessment & Plans:-    Epifanio Godoy is a 63 y.o. pleasant male who presents to be evaluated for rheumatoid arthritis.  Patient states he has progressive history of low back pain.  States the pain radiates down his legs at times.  Also has some pain in his left hip.  Notes that his back pain became worse after he had job where he would sit all day.  Had taken ibuprofen in the past which was helpful but is not able to take due to recent MI. has done physical therapy previously which she did find helpful at the time as well.  Tries lidocaine patches but not much relief with this.  Takes Tylenol which is somewhat helpful.  Denies any significant pain in his hands, knees, feet.  No noticeable joint swelling.  No family history of autoimmune arthritis.  Rheumatologic review of systems otherwise negative.No evidence of synovitis, dactylitis or enthesitis.      Reviewed all available old and outside pertinent medical records.    All lab results personally reviewed and interpreted by me.    1. Facet arthritis, degenerative, L5-S1 level, lumbosacral spine    2. Chronic bilateral low back pain with bilateral sciatica        Problem List Items Addressed This Visit          Neuro    Facet arthritis, degenerative, L5-S1 level, lumbosacral spine - Primary    Relevant Orders    Ambulatory referral/consult to Physical/Occupational Therapy    Ambulatory referral/consult to Pain Clinic     Other Visit Diagnoses       Chronic bilateral low back pain with bilateral sciatica        Relevant Orders    Ambulatory referral/consult to Physical/Occupational Therapy    Ambulatory referral/consult to Pain Clinic            Patient presenting with question of possible rheumatoid arthritis   His main area of pain is his low back which radiates down his leg at times   Also with some left hip pain at times  Low  suspicion for inflammatory arthritis   He has previous imaging consistent with facet arthropathy and degenerative disc disease  Recommend physical therapy and evaluation with Interventional pain  Provided low back exercises    # Follow up if symptoms worsen or fail to improve.    Chronic comorbid conditions affecting medical decision making today:    Past Medical History:   Diagnosis Date    Diabetes mellitus type II     Hypertension     Kidney stones     Type II or unspecified type diabetes mellitus without mention of complication, not stated as uncontrolled        Past Surgical History:   Procedure Laterality Date    COLONOSCOPY N/A 8/6/2020    Procedure: COLONOSCOPY**rapid Test**;  Surgeon: Stalin Williamson MD;  Location: [facility];  Service: Endoscopy;  Laterality: N/A;        Social History     Tobacco Use    Smoking status: Every Day     Current packs/day: 0.50     Average packs/day: 0.5 packs/day for 20.0 years (10.0 ttl pk-yrs)     Types: Cigarettes    Smokeless tobacco: Never   Substance Use Topics    Alcohol use: No     Alcohol/week: 2.5 standard drinks of alcohol     Types: 3 Standard drinks or equivalent per week    Drug use: No       Family History   Problem Relation Age of Onset    Hypertension Mother     Diabetes Mother     Hypertension Father     Diabetes Father        Review of patient's allergies indicates:   Allergen Reactions    Bactrim  [sulfamethoxazole-trimethoprim]      Other reaction(s): Swelling  Other reaction(s): Pineda-Bereket    Clindamycin      Other reaction(s): Swelling    Soliqua 100/33 [insulin glargine-lixisenatide]      Rash     Sulfa (sulfonamide antibiotics)        Medication List with Changes/Refills   Current Medications    AMLODIPINE (NORVASC) 5 MG TABLET    Take 5 mg by mouth every morning.    ATORVASTATIN (LIPITOR) 40 MG TABLET    Take 1 tablet (40 mg total) by mouth once daily.    CLOPIDOGREL (PLAVIX) 75 MG TABLET    Take 75 mg by mouth.    FLUTICASONE PROPIONATE  "(FLONASE) 50 MCG/ACTUATION NASAL SPRAY    SHAKE LIQUID AND USE 2 SPRAYS(100 MCG) IN EACH NOSTRIL EVERY DAY    INSULIN (LANTUS SOLOSTAR U-100 INSULIN) GLARGINE 100 UNITS/ML (3ML) SUBQ PEN    INJECT 25 UNITS INTO THE SKIN ONCE DAILY.    LANCETS MISC    To check BG 2-3 times daily, to use with insurance preferred meter    LIRAGLUTIDE 0.6 MG/0.1 ML, 18 MG/3 ML, SUBQ PNIJ (VICTOZA 3-JULIETTE) 0.6 MG/0.1 ML (18 MG/3 ML) PNIJ PEN    Inject 1.8 mg into the skin once daily.    LOSARTAN (COZAAR) 100 MG TABLET    TAKE 1 TABLET(100 MG) BY MOUTH EVERY DAY    METFORMIN (GLUCOPHAGE) 500 MG TABLET    TAKE 1 TABLET(500 MG) BY MOUTH TWICE DAILY WITH MEALS    PEN NEEDLE, DIABETIC (BD ULTRA-FINE MINI PEN NEEDLE) 31 GAUGE X 3/16" NDLE    USE 3 TIMES PER DAY    SEMAGLUTIDE (OZEMPIC) 1 MG/DOSE (4 MG/3 ML)    Inject 1 mg into the skin every 7 days.    SILDENAFIL (VIAGRA) 100 MG TABLET    Take 100 mg by mouth as needed.         Disclaimer: This note was prepared using voice recognition system and is likely to have sound alike errors and is not proofread.  Please message me with any questions.    45 minutes of total time spent on the encounter, which includes face to face time and non-face to face time preparing to see the patient (eg, review of tests), Obtaining and/or reviewing separately obtained history, Documenting clinical information in the electronic or other health record, Independently interpreting results (not separately reported) and communicating results to the patient/family/caregiver, or Care coordination (not separately reported).     Thank you for allowing me to participate in the care of Epifanio Godoy.    Brain Pollard MD    "

## 2023-08-15 NOTE — PATIENT INSTRUCTIONS
Try taking Tylenol up to 3000mg daily (from all sources)    Try taking Turmeric (make sure contains black pepper extract)

## 2023-09-05 ENCOUNTER — CLINICAL SUPPORT (OUTPATIENT)
Dept: REHABILITATION | Facility: HOSPITAL | Age: 63
End: 2023-09-05
Payer: COMMERCIAL

## 2023-09-05 DIAGNOSIS — M47.817 FACET ARTHRITIS, DEGENERATIVE, L5-S1 LEVEL, LUMBOSACRAL SPINE: ICD-10-CM

## 2023-09-05 DIAGNOSIS — M54.50 CHRONIC BILATERAL LOW BACK PAIN WITHOUT SCIATICA: ICD-10-CM

## 2023-09-05 DIAGNOSIS — M54.42 CHRONIC BILATERAL LOW BACK PAIN WITH BILATERAL SCIATICA: ICD-10-CM

## 2023-09-05 DIAGNOSIS — M54.41 CHRONIC BILATERAL LOW BACK PAIN WITH BILATERAL SCIATICA: ICD-10-CM

## 2023-09-05 DIAGNOSIS — R29.898 WEAKNESS OF BOTH LOWER EXTREMITIES: ICD-10-CM

## 2023-09-05 DIAGNOSIS — G89.29 CHRONIC BILATERAL LOW BACK PAIN WITH BILATERAL SCIATICA: ICD-10-CM

## 2023-09-05 DIAGNOSIS — G89.29 CHRONIC BILATERAL LOW BACK PAIN WITHOUT SCIATICA: ICD-10-CM

## 2023-09-05 DIAGNOSIS — M25.552 LEFT HIP PAIN: ICD-10-CM

## 2023-09-05 PROCEDURE — 97110 THERAPEUTIC EXERCISES: CPT | Mod: PN

## 2023-09-05 PROCEDURE — 97162 PT EVAL MOD COMPLEX 30 MIN: CPT | Mod: PN

## 2023-09-05 NOTE — PLAN OF CARE
[facility]  Physical Therapy Initial Evaluation    Date: 9/5/2023   Name: Epifanio Godoy  Clinic Number: [id number]    Therapy Diagnosis:   Encounter Diagnoses   Name Primary?    Facet arthritis, degenerative, L5-S1 level, lumbosacral spine     Chronic bilateral low back pain with bilateral sciatica     Chronic bilateral low back pain without sciatica     Left hip pain     Weakness of both lower extremities      Physician: Brain Pollard MD    Physician Orders: PT Eval and Treat   Medical Diagnosis from Referral:      M47.817 (ICD-10-CM) - Facet arthritis, degenerative, L5-S1 level, lumbosacral spine   M54.42,M54.41,G89.29 (ICD-10-CM) - Chronic bilateral low back pain with bilateral sciatica     Evaluation Date: 9/5/2023  Authorization Period Expiration: 8/14/2024  Plan of Care Expiration: 11/5/2023  Visit # / Visits authorized: 1/ 1    PN DUE: 10/5/2023    Time In: 11:15 AM  Time Out: 12:00 PM  Total Appointment Time (timed & untimed codes): 45 minutes    Precautions: Standard    Subjective   Date of onset: Patient reports progressive history of low back pain that occasionally radiates down bilateral legs. He states that his left hip pain began about 2-3 months ago.   Began with pain in low back and posterior bilateral legs before hip pain.     History of current condition - Epifanio reports: bilateral low back pain and left hip pain. He states that his pain began in his low back and posterior legs before his hip pain began. He reports that mornings are worse than nights and it takes about 10-15 minutes after getting out of bed to calm down. He has increased pain with carrying and lifting objects, work duties that include but not limited too crawling, squatting, stair climbing, ladder climbing, and duck walking. He loves running and going to festivals and is currently unable to go to festivals due to prolonged walking and running due to pain. He recently had an MI on June 28th and has been  released with no restrictions.     Medical History:   Past Medical History:   Diagnosis Date    Diabetes mellitus type II     Hypertension     Kidney stones     Type II or unspecified type diabetes mellitus without mention of complication, not stated as uncontrolled      Surgical History:   Epifanio Godoy  has a past surgical history that includes Colonoscopy (N/A, 8/6/2020).    Medications:   Epifanio has a current medication list which includes the following prescription(s): amlodipine, atorvastatin, clopidogrel, fluticasone propionate, lantus solostar u-100 insulin, lancets, victoza 3-lew, losartan, metformin, pen needle, diabetic, ozempic, and sildenafil.    Allergies:   Review of patient's allergies indicates:   Allergen Reactions    Bactrim  [sulfamethoxazole-trimethoprim]      Other reaction(s): Swelling  Other reaction(s): Pineda-Bereket    Clindamycin      Other reaction(s): Swelling    Soliqua 100/33 [insulin glargine-lixisenatide]      Rash     Sulfa (sulfonamide antibiotics)       Imaging: None    Prior Therapy: Yes  Social History:  lives with their family  Occupation:  for construction- climbing, crawling, stair, ladder, duck walking   Prior Level of Function: independent with functional mobility, recreational activities, house hold chores, and ADLs  Current Level of Function: increased pain with work duties,     Pain:  Current 8/10, worst 10/10, best 5/10   Location: bilateral  low back pain and left hip pain  Description: Aching, Dull, Throbbing, Sharp, and Shooting  Aggravating Factors: Sitting, Standing, Bending, Walking, Morning, Lifting, and Getting out of bed/chair  Easing Factors: tylenol, lidocaine patches (not much relief)     Patients goals: decreased low back pain, decreased left hip pain     Objective     Observation: pt pleasant and appropriate throughout evaluation; A&O x 3     Posture:  in static standing pt exhibits erect overall posture    Sensation: pt denies any numbness and  tingling in bilateral lower extremities. Light touch intact to bilateral lower extremities.     Gait Analysis: Pt ambulates with decreased bilateral trunk rotation, decreased arm swing, and decreased bilateral step length.     Movement Analysis:  - single leg stance: Pt able to hold bilateral single lg stance for 25 seconds. Increased left hip pain  - Heel raises: decreased heel height, difficulty and pain noted during Left single leg heel raises  - Squat: Pt exhibits proper form during deep squat, no pain reported    Lumbar Range of Motion:    % of normal motion Pain   Flexion 100%   No pain        Extension 100%   Increased pain in central back        Left Side Bending 100% Slight pain        Right Side Bending 100% Left hip and back apin        Left rotation   100% pain        Right Rotation   100% pain           Lower Extremity Strength  Right LE  Left LE    Knee extension: 5/5 Knee extension: 4+/5   Knee flexion: 4+/5 Knee flexion: 4/5   Hip flexion: 4/5 Hip flexion: 4/5   Hip extension:  4/5 Hip extension: 4/5   Hip abduction: 4+/5 Hip abduction: 4+/5   Hip adduction: 4+/5 Hip adduction 4+/5   Ankle dorsiflexion: 4+/5 Ankle dorsiflexion: 4+/5   Ankle plantarflexion: 4+/5 Ankle plantarflexion: 4/5       Special Tests:  -Repeated Flexion: increased pain  -Repeated Ext: increased pain    Neuro Dynamic Testing:    Sciatic nerve:  SLR: negative bilaterally        Femoral Nerve: Femoral nerve test: positive for back pain bilaterally     Joint Mobility: normal lumbar joint mobility; increased pain with PA's along L4-S1; Decreased thoracic mobility     Palpation: TTP over bilateral lumbar paraspinals, QL, and slight tenderness over piriformis     Function: FOTO      TREATMENT   Treatment Time In: 11:45 AM  Treatment Time Out: 12:00 PM  Total Treatment time (time-based codes) separate from Evaluation: 15 minutes    Epifanio received therapeutic exercises to develop strength, endurance, ROM, flexibility, posture, and core  "stabilization for 15 minutes including:  F4 LTR x 10 ea  Single knee to chest 5 x 5"  (B) piriformis stretch 2 x 30"  (B) side-lying hamstring stretch 2 x 30"  Alternating isometrics 10 x 10" '  Gastroc stretch 2 x 30 "  SL heel raises    Home Exercises and Patient Education Provided    Education provided:   - - PT POC  - HEP  - PT prognosis and diagnosis  - all questions and concerns answered     Written Home Exercises Provided: yes.  Exercises were reviewed and Epifanio was able to demonstrate them prior to the end of the session.  Epifanio demonstrated good  understanding of the education provided.     See EMR under Patient Instructions for exercises provided 9/5/2023.    Assessment   Epifanio is a 63 y.o. male referred to outpatient Physical Therapy with a medical diagnosis of   M47.817 (ICD-10-CM) - Facet arthritis, degenerative, L5-S1 level, lumbosacral spine   M54.42,M54.41,G89.29 (ICD-10-CM) - Chronic bilateral low back pain with bilateral sciatica     The patient presents with impairments which include impairments list: strength, joint mobility, muscle length, balance, and gait mechanics.  These impairments are limiting patient's ability to perform prolonged standing, .     Pt prognosis is Good due to personal factors and co-morbidities listed below. Pt will benefit from skilled outpatient Physical Therapy to address the deficits stated above and in the chart below, provide pt/family education, and to maximize pt's level of independence.     Plan of care discussed with patient: Yes  Patient's spiritual, cultural and educational needs considered and patient is agreeable to the plan of care and goals as stated below:     Anticipated Barriers for therapy: pain    Medical Necessity is demonstrated by the following:   History  Co-morbidities and personal factors that may impact the plan of care [] LOW: no personal factors / co-morbidities  [x] MODERATE: 1-2 personal factors / co-morbidities  [] HIGH: 3+ personal factors / " co-morbidities    Moderate / High Support Documentation:   Co-morbidities affecting plan of care: See Above    Personal Factors:   age     Examination  Body Structures and Functions, activity limitations and participation restrictions that may impact the plan of care [] LOW: addressing 1-2 elements  [x] MODERATE: 3+ elements  [] HIGH: 4+ elements (please support below)    Moderate / High Support Documentation: (B) low back pain, (L) hip pain, lower extremity weakness, impaired functional mobility      Clinical Presentation [] LOW: stable  [x] MODERATE: Evolving  [] HIGH: Unstable     Decision Making/ Complexity Score: moderate       Goals:  Short Term Goals: 5 weeks   Patient will demonstrate independence with HEP in order to progress toward functional independence  Pt will demonstrate improved pain by reports of less than or equal to 5/10 worst pain on the verbal rating scale in order to progress toward maximal functional ability and improve QOL.    Long Term Goals: 8-10 weeks   Patient will demonstrate improved function as indicated by a functional limitation score of 45% on the FOTO.  Pt will demonstrate improved pain by reports of less than or equal to 5/10 worst pain on the verbal rating scale in order to progress toward maximal functional ability and improve QOL.  Pt will improve bilateral lower extremity strength for 5/5 for improved functional mobility.     Plan   Plan of care Certification: 9/5/2023 to 12/5/2023.    Outpatient Physical Therapy 2 times weekly for 10 weeks to include the following interventions: Cervical/Lumbar Traction, Electrical Stimulation , Gait Training, Manual Therapy, Moist Heat/ Ice, Neuromuscular Re-ed, Orthotic Management and Training, Patient Education, Self Care, Therapeutic Activities, and Therapeutic Exercise. And any other therapies and modalities as clinically indicated and appropriate, including but not limited to FDN and telehealth. Pt may be seen by PTA as a part of pt's  care team.     Naa Mosqueda, PT, DPT  9/5/2023

## 2023-09-05 NOTE — PROGRESS NOTES
[facility]  Physical Therapy Initial Evaluation    Date: 9/5/2023   Name: Epifanio Godoy  Clinic Number: [id number]    Therapy Diagnosis:   Encounter Diagnoses   Name Primary?    Facet arthritis, degenerative, L5-S1 level, lumbosacral spine     Chronic bilateral low back pain with bilateral sciatica     Chronic bilateral low back pain without sciatica     Left hip pain     Weakness of both lower extremities      Physician: Brain Pollard MD    Physician Orders: PT Eval and Treat   Medical Diagnosis from Referral:      M47.817 (ICD-10-CM) - Facet arthritis, degenerative, L5-S1 level, lumbosacral spine   M54.42,M54.41,G89.29 (ICD-10-CM) - Chronic bilateral low back pain with bilateral sciatica     Evaluation Date: 9/5/2023  Authorization Period Expiration: 8/14/2024  Plan of Care Expiration: 11/5/2023  Visit # / Visits authorized: 1/ 1    PN DUE: 10/5/2023    Time In: 11:15 AM  Time Out: 12:00 PM  Total Appointment Time (timed & untimed codes): 45 minutes    Precautions: Standard    Subjective   Date of onset: Patient reports progressive history of low back pain that occasionally radiates down bilateral legs. He states that his left hip pain began about 2-3 months ago.   Began with pain in low back and posterior bilateral legs before hip pain.     History of current condition - Epifanio reports: bilateral low back pain and left hip pain. He states that his pain began in his low back and posterior legs before his hip pain began. He reports that mornings are worse than nights and it takes about 10-15 minutes after getting out of bed to calm down. He has increased pain with carrying and lifting objects, work duties that include but not limited too crawling, squatting, stair climbing, ladder climbing, and duck walking. He loves running and going to festivals and is currently unable to go to festivals due to prolonged walking and running due to pain. He recently had an MI on June 28th and has been  released with no restrictions.     Medical History:   Past Medical History:   Diagnosis Date    Diabetes mellitus type II     Hypertension     Kidney stones     Type II or unspecified type diabetes mellitus without mention of complication, not stated as uncontrolled      Surgical History:   Epifanio Godoy  has a past surgical history that includes Colonoscopy (N/A, 8/6/2020).    Medications:   Epifanio has a current medication list which includes the following prescription(s): amlodipine, atorvastatin, clopidogrel, fluticasone propionate, lantus solostar u-100 insulin, lancets, victoza 3-lew, losartan, metformin, pen needle, diabetic, ozempic, and sildenafil.    Allergies:   Review of patient's allergies indicates:   Allergen Reactions    Bactrim  [sulfamethoxazole-trimethoprim]      Other reaction(s): Swelling  Other reaction(s): Pineda-Bereket    Clindamycin      Other reaction(s): Swelling    Soliqua 100/33 [insulin glargine-lixisenatide]      Rash     Sulfa (sulfonamide antibiotics)       Imaging: None    Prior Therapy: Yes  Social History:  lives with their family  Occupation:  for construction- climbing, crawling, stair, ladder, duck walking   Prior Level of Function: independent with functional mobility, recreational activities, house hold chores, and ADLs  Current Level of Function: increased pain with work duties,     Pain:  Current 8/10, worst 10/10, best 5/10   Location: bilateral  low back pain and left hip pain  Description: Aching, Dull, Throbbing, Sharp, and Shooting  Aggravating Factors: Sitting, Standing, Bending, Walking, Morning, Lifting, and Getting out of bed/chair  Easing Factors: tylenol, lidocaine patches (not much relief)     Patients goals: decreased low back pain, decreased left hip pain     Objective     Observation: pt pleasant and appropriate throughout evaluation; A&O x 3     Posture:  in static standing pt exhibits erect overall posture    Sensation: pt denies any numbness and  tingling in bilateral lower extremities. Light touch intact to bilateral lower extremities.     Gait Analysis: Pt ambulates with decreased bilateral trunk rotation, decreased arm swing, and decreased bilateral step length.     Movement Analysis:  - single leg stance: Pt able to hold bilateral single lg stance for 25 seconds. Increased left hip pain  - Heel raises: decreased heel height, difficulty and pain noted during Left single leg heel raises  - Squat: Pt exhibits proper form during deep squat, no pain reported    Lumbar Range of Motion:    % of normal motion Pain   Flexion 100%   No pain        Extension 100%   Increased pain in central back        Left Side Bending 100% Slight pain        Right Side Bending 100% Left hip and back apin        Left rotation   100% pain        Right Rotation   100% pain           Lower Extremity Strength  Right LE  Left LE    Knee extension: 5/5 Knee extension: 4+/5   Knee flexion: 4+/5 Knee flexion: 4/5   Hip flexion: 4/5 Hip flexion: 4/5   Hip extension:  4/5 Hip extension: 4/5   Hip abduction: 4+/5 Hip abduction: 4+/5   Hip adduction: 4+/5 Hip adduction 4+/5   Ankle dorsiflexion: 4+/5 Ankle dorsiflexion: 4+/5   Ankle plantarflexion: 4+/5 Ankle plantarflexion: 4/5       Special Tests:  -Repeated Flexion: increased pain  -Repeated Ext: increased pain    Neuro Dynamic Testing:    Sciatic nerve:  SLR: negative bilaterally        Femoral Nerve: Femoral nerve test: positive for back pain bilaterally     Joint Mobility: normal lumbar joint mobility; increased pain with PA's along L4-S1; Decreased thoracic mobility     Palpation: TTP over bilateral lumbar paraspinals, QL, and slight tenderness over piriformis     Function: FOTO      TREATMENT   Treatment Time In: 11:45 AM  Treatment Time Out: 12:00 PM  Total Treatment time (time-based codes) separate from Evaluation: 15 minutes    Epifanio received therapeutic exercises to develop strength, endurance, ROM, flexibility, posture, and core  "stabilization for 15 minutes including:  F4 LTR x 10 ea  Single knee to chest 5 x 5"  (B) piriformis stretch 2 x 30"  (B) side-lying hamstring stretch 2 x 30"  Alternating isometrics 10 x 10" '  Gastroc stretch 2 x 30 "  SL heel raises    Home Exercises and Patient Education Provided    Education provided:   - - PT POC  - HEP  - PT prognosis and diagnosis  - all questions and concerns answered     Written Home Exercises Provided: yes.  Exercises were reviewed and Epifanio was able to demonstrate them prior to the end of the session.  Epifanio demonstrated good  understanding of the education provided.     See EMR under Patient Instructions for exercises provided  9/5/2023 .    Assessment   Epifanio is a 63 y.o. male referred to outpatient Physical Therapy with a medical diagnosis of   M47.817 (ICD-10-CM) - Facet arthritis, degenerative, L5-S1 level, lumbosacral spine   M54.42,M54.41,G89.29 (ICD-10-CM) - Chronic bilateral low back pain with bilateral sciatica     The patient presents with impairments which include impairments list: strength, joint mobility, muscle length, balance, and gait mechanics.  These impairments are limiting patient's ability to perform prolonged standing, .     Pt prognosis is Good due to personal factors and co-morbidities listed below. Pt will benefit from skilled outpatient Physical Therapy to address the deficits stated above and in the chart below, provide pt/family education, and to maximize pt's level of independence.     Plan of care discussed with patient: Yes  Patient's spiritual, cultural and educational needs considered and patient is agreeable to the plan of care and goals as stated below:     Anticipated Barriers for therapy: pain    Medical Necessity is demonstrated by the following:   History  Co-morbidities and personal factors that may impact the plan of care [] LOW: no personal factors / co-morbidities  [x] MODERATE: 1-2 personal factors / co-morbidities  [] HIGH: 3+ personal factors / " co-morbidities    Moderate / High Support Documentation:   Co-morbidities affecting plan of care: See Above    Personal Factors:   age     Examination  Body Structures and Functions, activity limitations and participation restrictions that may impact the plan of care [] LOW: addressing 1-2 elements  [x] MODERATE: 3+ elements  [] HIGH: 4+ elements (please support below)    Moderate / High Support Documentation: (B) low back pain, (L) hip pain, lower extremity weakness, impaired functional mobility      Clinical Presentation [] LOW: stable  [x] MODERATE: Evolving  [] HIGH: Unstable     Decision Making/ Complexity Score: moderate       Goals:  Short Term Goals: 5 weeks   Patient will demonstrate independence with HEP in order to progress toward functional independence  Pt will demonstrate improved pain by reports of less than or equal to 5/10 worst pain on the verbal rating scale in order to progress toward maximal functional ability and improve QOL.    Long Term Goals: 8-10 weeks   Patient will demonstrate improved function as indicated by a functional limitation score of 45% on the FOTO.  Pt will demonstrate improved pain by reports of less than or equal to 5/10 worst pain on the verbal rating scale in order to progress toward maximal functional ability and improve QOL.  Pt will improve bilateral lower extremity strength for 5/5 for improved functional mobility.     Plan   Plan of care Certification: 9/5/2023 to 12/5/2023.    Outpatient Physical Therapy 2 times weekly for 10 weeks to include the following interventions: Cervical/Lumbar Traction, Electrical Stimulation  , Gait Training, Manual Therapy, Moist Heat/ Ice, Neuromuscular Re-ed, Orthotic Management and Training, Patient Education, Self Care, Therapeutic Activities, and Therapeutic Exercise. And any other therapies and modalities as clinically indicated and appropriate, including but not limited to FDN and telehealth. Pt may be seen by PTA as a part of pt's  care team.     Naa Mosqueda, PT, DPT  9/5/2023

## 2023-09-12 ENCOUNTER — TELEPHONE (OUTPATIENT)
Dept: REHABILITATION | Facility: HOSPITAL | Age: 63
End: 2023-09-12

## 2023-09-12 NOTE — TELEPHONE ENCOUNTER
Name: Epifanio Godoy  Clinic Number: [id number]      Call Date: 9/12/2023    Reason for call: missed appointment    Discussion: Epifanio Godoy was reached via phone number and stated that his work schedule changed to days and he is unable to make all the morning appointments he has scheduled.     Plan: reschedule to afternoons

## 2023-09-18 ENCOUNTER — TELEPHONE (OUTPATIENT)
Dept: PAIN MEDICINE | Facility: CLINIC | Age: 63
End: 2023-09-18
Payer: COMMERCIAL

## 2023-09-20 ENCOUNTER — PATIENT MESSAGE (OUTPATIENT)
Dept: PRIMARY CARE CLINIC | Facility: CLINIC | Age: 63
End: 2023-09-20
Payer: COMMERCIAL

## 2023-09-20 DIAGNOSIS — E11.9 TYPE 2 DIABETES MELLITUS WITHOUT COMPLICATION, WITH LONG-TERM CURRENT USE OF INSULIN: ICD-10-CM

## 2023-09-20 DIAGNOSIS — Z79.4 TYPE 2 DIABETES MELLITUS WITHOUT COMPLICATION, WITH LONG-TERM CURRENT USE OF INSULIN: ICD-10-CM

## 2023-09-20 DIAGNOSIS — E11.69 HYPERLIPIDEMIA ASSOCIATED WITH TYPE 2 DIABETES MELLITUS: ICD-10-CM

## 2023-09-20 DIAGNOSIS — E78.5 HYPERLIPIDEMIA ASSOCIATED WITH TYPE 2 DIABETES MELLITUS: ICD-10-CM

## 2023-09-20 RX ORDER — ATORVASTATIN CALCIUM 40 MG/1
40 TABLET, FILM COATED ORAL
Qty: 90 TABLET | Refills: 3 | OUTPATIENT
Start: 2023-09-20

## 2023-09-20 NOTE — TELEPHONE ENCOUNTER
Care Due:                  Date            Visit Type   Department     Provider  --------------------------------------------------------------------------------                                MYCHART                              ANNUAL                              CHECKUP/PHY  [facility] PRIMARY  Last Visit: 05-      [facility]           Hollyjennifer Shepherd  Next Visit: None Scheduled  None         None Found                                                            Last  Test          Frequency    Reason                     Performed    Due Date  --------------------------------------------------------------------------------    Office Visit  12 months..  atorvastatin, insulin,     05- 05-                             losartan, metFORMIN,                             semaglutide..............    HBA1C.......  6 months...  insulin, metFORMIN,        05-   11-                             semaglutide..............    Lipid Panel.  12 months..  atorvastatin.............  05- 05-    [facility] Embedded Care Due Messages. Reference number: [id number].   9/20/2023 5:16:57 AM CDT

## 2023-09-25 ENCOUNTER — TELEPHONE (OUTPATIENT)
Dept: PAIN MEDICINE | Facility: CLINIC | Age: 63
End: 2023-09-25
Payer: COMMERCIAL

## 2023-09-26 ENCOUNTER — TELEPHONE (OUTPATIENT)
Dept: PAIN MEDICINE | Facility: CLINIC | Age: 63
End: 2023-09-26
Payer: COMMERCIAL

## 2025-01-02 ENCOUNTER — PATIENT OUTREACH (OUTPATIENT)
Dept: ADMINISTRATIVE | Facility: HOSPITAL | Age: 65
End: 2025-01-02
Payer: COMMERCIAL